# Patient Record
Sex: FEMALE | Race: WHITE | Employment: OTHER | URBAN - METROPOLITAN AREA
[De-identification: names, ages, dates, MRNs, and addresses within clinical notes are randomized per-mention and may not be internally consistent; named-entity substitution may affect disease eponyms.]

---

## 2017-02-18 ENCOUNTER — HOSPITAL ENCOUNTER (EMERGENCY)
Facility: CLINIC | Age: 57
Discharge: HOME | End: 2017-02-18
Attending: EMERGENCY MEDICINE | Admitting: EMERGENCY MEDICINE
Payer: MEDICAID

## 2017-02-18 VITALS
TEMPERATURE: 99 F | DIASTOLIC BLOOD PRESSURE: 96 MMHG | OXYGEN SATURATION: 98 % | WEIGHT: 218 LBS | HEART RATE: 69 BPM | SYSTOLIC BLOOD PRESSURE: 151 MMHG | BODY MASS INDEX: 39.87 KG/M2 | RESPIRATION RATE: 18 BRPM

## 2017-02-18 DIAGNOSIS — H65.01 RIGHT ACUTE SEROUS OTITIS MEDIA, RECURRENCE NOT SPECIFIED: ICD-10-CM

## 2017-02-18 DIAGNOSIS — H83.01 LABYRINTHITIS OF RIGHT EAR: ICD-10-CM

## 2017-02-18 DIAGNOSIS — H81.391 PERIPHERAL VERTIGO, RIGHT: ICD-10-CM

## 2017-02-18 PROCEDURE — 99282 EMERGENCY DEPT VISIT SF MDM: CPT

## 2017-02-18 RX ORDER — ONDANSETRON 4 MG/1
4 TABLET, ORALLY DISINTEGRATING ORAL EVERY 8 HOURS PRN
Qty: 10 TABLET | Refills: 0 | Status: SHIPPED | OUTPATIENT
Start: 2017-02-18 | End: 2017-02-21

## 2017-02-18 RX ORDER — MECLIZINE HCL 12.5 MG 12.5 MG/1
12.5 TABLET ORAL 4 TIMES DAILY PRN
Qty: 30 TABLET | Refills: 0 | Status: SHIPPED | OUTPATIENT
Start: 2017-02-18

## 2017-02-18 RX ORDER — ASCORBIC ACID 250 MG
250 TABLET,CHEWABLE ORAL DAILY
COMMUNITY

## 2017-02-18 RX ORDER — FLUTICASONE PROPIONATE 50 MCG
2 SPRAY, SUSPENSION (ML) NASAL DAILY
COMMUNITY

## 2017-02-18 RX ORDER — BIOTIN 10 MG
10000 TABLET ORAL DAILY
COMMUNITY
End: 2018-05-31

## 2017-02-18 RX ORDER — POLYETHYLENE GLYCOL 3350 17 G/17G
1 POWDER, FOR SOLUTION ORAL DAILY
COMMUNITY
End: 2018-05-31

## 2017-02-18 RX ORDER — ESTRADIOL 0.1 MG/G
1 CREAM VAGINAL
COMMUNITY

## 2017-02-18 ASSESSMENT — ENCOUNTER SYMPTOMS
NAUSEA: 1
HEADACHES: 0
DIZZINESS: 1

## 2017-02-18 NOTE — ED AVS SNAPSHOT
[facility] Emergency Department    [address]    [facility] [address]    Phone:  [phone]    Fax:  [phone]                                       Moni Liriano   MRN: [id number]    Department:  [facility] Emergency Department   Date of Visit:  2/18/2017           After Visit Summary Signature Page     I have received my discharge instructions, and my questions have been answered. I have discussed any challenges I see with this plan with the nurse or doctor.    ..........................................................................................................................................  Patient/Patient Representative Signature      ..........................................................................................................................................  Patient Representative Print Name and Relationship to Patient    ..................................................               ................................................  Date                                            Time    ..........................................................................................................................................  Reviewed by Signature/Title    ...................................................              ..............................................  Date                                                            Time

## 2017-02-18 NOTE — ED AVS SNAPSHOT
[facility] Emergency Department    [address]    [facility] [address]    Phone:  [phone]    Fax:  [phone]                                       Moni Liriano   MRN: [id number]    Department:  [facility] Emergency Department   Date of Visit:  2/18/2017           Patient Information     Date Of Birth          1960        Your diagnoses for this visit were:     Right acute serous otitis media, recurrence not specified     Labyrinthitis of right ear     Peripheral vertigo, right        You were seen by Seth Red MD.      Follow-up Information     Follow up with Jose Bill MD In 2 days.    Specialty:  Otolaryngology    Contact information:    [facility]  [address]  [facility] [address]  [phone]          Discharge Instructions         Fluid in the Middle Ear, No Infection (Adult)  Earaches can happen without an infection. This occurs when air and fluid build up behind the eardrum causing a feeling of fullness and discomfort and reduced hearing. This is called otitis media with effusion (OME) or serous otitis media. It means there is fluid in the middle ear. It is not the same as acute otitis media, which is typically from infection.  OME can happen when you have a cold if congestion blocks the passage that drains the middle ear (eustachian tube). It may also occur with nasal allergies or after a bacterial middle ear infection.    The pain/discomfort may come and go. You may hear clicking or popping sounds when you chew or swallow. You may feel that your balance is off. Or you may hear ringing in the ear.  It often takes from several weeks up to 3 months for the fluid to clear on its own. Oral pain relievers and ear drops help if there is pain. Decongestants and antihistamines sometimes help. Antibiotics don't help since there is no infection. Your doctor may prescribe a nasal spray to help reduce swelling in the nose and  eustachian tube. This can allow the ear to drain.  If it doesn't improve after 3 months, surgery may be used to drain the fluid and insert a small tube in the eardrum to allow continued drainage.  Because the middle ear fluid can become infected, it is important to watch for signs of an ear infection which may develop later. These signs include increased ear pain, fever, or drainage from the ear.  Home care  The following guidelines will help you care for yourself at home:    You may use acetaminophen or ibuprofen to control pain, unless another medicine was prescribed. [NOTE: If you have chronic liver or kidney disease or ever had a stomach ulcer or GI bleeding, talk with your doctor before using these medicines.] (Aspirin should never be used in anyone under 18 years of age who is ill with a fever. It may cause severe liver damage.)    While not always helpful, you may use over-the-counter decongestants such as phenylephrine or pseudoephedrine. Don't use nasal spray decongestants more than 3 days. Longer use can make congestion worse. (Prescription nasal sprays from your doctor don't typically have those restrictions.)    Antihistamines may help if you are also having allergy symptoms.    You may use medicines such as guaifenesin to thin mucus and promote drainage.  Follow-up care  Follow up with your doctor or as advised if you are not feeling better after 3 days.  When to seek medical care  Get prompt medical attention if any of the following occur:    Ear pain gets worse or does not start to improve     Fever of 100.4 F (38 C) or higher, or as directed by your health care provider    Fluid or blood draining from the ear    Headache or sinus pain    Stiff neck    Unusual drowsiness or confusion    6926-5080 [employer]. [address] [place] [address]. All rights reserved. This information is not intended as a substitute for professional medical care. Always follow your healthcare  professional's instructions.    Discharge Instructions  Vertigo  You have been diagnosed with vertigo.  This is a feeling that you are spinning or that the room is moving around you. You will often have nausea, vomiting, and balance problems with it.  Vertigo is usually caused by a problem in the inner ear which helps control your balance.  Many things can cause vertigo, including calcium collections in the inner ear, a virus infection of the inner ear, concussion, migraine, and some medicines.  Luckily, these causes are not life threatening and will eventually go away.  However, sometimes there is a serious problem that does not show up right away.  Return to the Emergency Department if you have:    New or severe headache.    Temperature greater than 100.4 F (38 C).    Seeing double or having trouble seeing clearly.    Trouble speaking or hearing.    Weakness in an arm or leg.    Passing out.    Numbness or tingling.    Chest pain.    Vomiting that will not stop.    Treatment:    An antihistamine, such as Antivert  (meclizine), or non-prescription medicines like Dramamine  (dimenhydrinate), or Benadryl  (diphenhydramine).    Prescription anti-nausea medicines, such as Phenergan  (promethazine), Reglan  (metoclopramide), or Zofran  (ondansetron).    Prescription sedative medicines, such as Valium  (diazepam), Ativan  (lorazepam), or Klonopin  (clonazepam).    Most of these medicines make you sleepy, and you should not take them before you work or drive. You should only take prescription medicines to treat severe vertigo symptoms, and you should stop the medicine when your symptoms improve.    Follow Up:    If you have vertigo longer than three days, it is important that you follow up either with your primary doctor or an Ear Nose and Throat doctor.  You may need further testing to evaluate your vertigo and you may also need  vestibular  therapy which is a special form of physical therapy to make the vertigo go away.     If you were given a prescription for medicine here today, be sure to read all of the information (including the package insert) that comes with your prescription.  This will include important information about the medicine, its side effects, and any warnings that you need to know about.  The pharmacist who fills the prescription can provide more information and answer questions you may have about the medicine.  If you have questions or concerns that the pharmacist cannot address, please call or return to the Emergency Department.       Opioid Medication Information    Pain medications are among the most commonly prescribed medicines, so we are including this information for all our patients. If you did not receive pain medication or get a prescription for pain medicine, you can ignore it.     You may have been given a prescription for an opioid (narcotic) pain medicine and/or have received a pain medicine while here in the Emergency Department. These medicines can make you drowsy or impaired. You must not drive, operate dangerous equipment, or engage in any other dangerous activities while taking these medications. If you drive while taking these medications, you could be arrested for DUI, or driving under the influence. Do not drink any alcohol while you are taking these medications.     Opioid pain medications can cause addiction. If you have a history of chemical dependency of any type, you are at a higher risk of becoming addicted to pain medications.  Only take these prescribed medications to treat your pain when all other options have been tried. Take it for as short a time and as few doses as possible. Store your pain pills in a secure place, as they are frequently stolen and provide a dangerous opportunity for children or visitors in your house to start abusing these powerful medications. We will not replace any lost or stolen medicine.  As soon as your pain is better, you should flush all your remaining  medication.     Many prescription pain medications contain Tylenol  (acetaminophen), including Vicodin , Tylenol #3 , Norco , Lortab , and Percocet .  You should not take any extra pills of Tylenol  if you are using these prescription medications or you can get very sick.  Do not ever take more than 3000 mg of acetaminophen in any 24 hour period.    All opioids tend to cause constipation. Drink plenty of water and eat foods that have a lot of fiber, such as fruits, vegetables, prune juice, apple juice and high fiber cereal.  Take a laxative if you don t move your bowels at least every other day. Miralax , Milk of Magnesia, Colace , or Senna  can be used to keep you regular.      Remember that you can always come back to the Emergency Department if you are not able to see your regular doctor in the amount of time listed above, if you get any new symptoms, or if there is anything that worries you.              24 Hour Appointment Hotline       To make an appointment at any [facility], call [phone] ([phone]). If you don't have a family doctor or clinic, we will help you find one. [facility] are conveniently located to serve the needs of you and your family.             Review of your medicines      START taking        Dose / Directions Last dose taken    meclizine 12.5 MG tablet   Commonly known as:  ANTIVERT   Dose:  12.5 mg   Quantity:  30 tablet        Take 1 tablet (12.5 mg) by mouth 4 times daily as needed for dizziness   Refills:  0        ondansetron 4 MG ODT tab   Commonly known as:  ZOFRAN ODT   Dose:  4 mg   Quantity:  10 tablet        Take 1 tablet (4 mg) by mouth every 8 hours as needed for nausea   Refills:  0          Our records show that you are taking the medicines listed below. If these are incorrect, please call your family doctor or clinic.        Dose / Directions Last dose taken    ascorbic acid 250 MG Chew chewable tablet   Commonly known as:  vitamin C   Dose:  250 mg         Take 250 mg by mouth daily   Refills:  0        Biotin 10 MG Tabs tablet   Dose:  75813 mcg        Take 10,000 mcg by mouth daily   Refills:  0        CLINDAMYCIN HCL PO        Refills:  0        CLONAZEPAM PO   Dose:  1 mg        Take 1 mg by mouth At Bedtime   Refills:  0        cyanocobalamin 1000 MCG/ML injection   Commonly known as:  VITAMIN B12   Dose:  1 mL        Inject 1 mL as directed every 30 days   Refills:  0        CYCLOBENZAPRINE HCL PO        Refills:  0        CYMBALTA PO   Dose:  120 mg        Take 120 mg by mouth every morning   Refills:  0        DOCUSATE SODIUM PO        Refills:  0        estradiol 0.1 MG/GM cream   Commonly known as:  ESTRACE   Dose:  2 g        Place 2 g vaginally twice a week   Refills:  0        FEXOFENADINE HCL PO        Refills:  0        fluticasone 50 MCG/ACT spray   Commonly known as:  FLONASE   Dose:  1 spray        Spray 1 spray into both nostrils daily   Refills:  0        FOLIC ACID PO   Dose:  3 mg        Take 3 mg by mouth daily   Refills:  0        gabapentin 300 MG capsule   Commonly known as:  NEURONTIN   Dose:  300 mg        Take 300 mg by mouth 3 times daily   Refills:  0        HYDROXYZINE HCL PO        Refills:  0        IBUPROFEN PO   Dose:  800 mg        Take 800 mg by mouth   Refills:  0        LAMICTAL PO   Dose:  150 mg        Take 150 mg by mouth At Bedtime   Refills:  0        NAPROXEN PO   Dose:  500 mg        Take 500 mg by mouth   Refills:  0        OMEPRAZOLE PO        Refills:  0        OXYCONTIN PO        Refills:  0        polyethylene glycol Packet   Commonly known as:  MIRALAX/GLYCOLAX   Dose:  1 packet        Take 1 packet by mouth daily   Refills:  0        TRAZODONE HCL PO   Dose:  150 mg        Take 150 mg by mouth At Bedtime   Refills:  0                Prescriptions were sent or printed at these locations (2 Prescriptions)                   Other Prescriptions                Printed at Department/Unit printer (2 of 2)          meclizine (ANTIVERT) 12.5 MG tablet               ondansetron (ZOFRAN ODT) 4 MG ODT tab                Orders Needing Specimen Collection     None      Pending Results     No orders found from 2/16/2017 to 2/19/2017.            Pending Culture Results     No orders found from 2/16/2017 to 2/19/2017.             Test Results from your hospital stay            Clinical Quality Measure: Blood Pressure Screening     Your blood pressure was checked while you were in the emergency department today. The last reading we obtained was  BP: (!) 151/96 . Please read the guidelines below about what these numbers mean and what you should do about them.  If your systolic blood pressure (the top number) is less than 120 and your diastolic blood pressure (the bottom number) is less than 80, then your blood pressure is normal. There is nothing more that you need to do about it.  If your systolic blood pressure (the top number) is 120-139 or your diastolic blood pressure (the bottom number) is 80-89, your blood pressure may be higher than it should be. You should have your blood pressure rechecked within a year by a primary care provider.  If your systolic blood pressure (the top number) is 140 or greater or your diastolic blood pressure (the bottom number) is 90 or greater, you may have high blood pressure. High blood pressure is treatable, but if left untreated over time it can put you at risk for heart attack, stroke, or kidney failure. You should have your blood pressure rechecked by a primary care provider within the next 4 weeks.  If your provider in the emergency department today gave you specific instructions to follow-up with your doctor or provider even sooner than that, you should follow that instruction and not wait for up to 4 weeks for your follow-up visit.        Thank you for choosing [facility]       Thank you for choosing [facility] for your care. Our goal is always to provide you with excellent care. Hearing back from  "our patients is one way we can continue to improve our services. Please take a few minutes to complete the written survey that you may receive in the mail after you visit with us. Thank you!        [employer] Information     [employer] lets you send messages to your doctor, view your test results, renew your prescriptions, schedule appointments and more. To sign up, go to [email or web address] . Click on \"Log in\" on the left side of the screen, which will take you to the Welcome page. Then click on \"Sign up Now\" on the right side of the page.     You will be asked to enter the access code listed below, as well as some personal information. Please follow the directions to create your username and password.     Your access code is: [email or web address]  Expires: 2017  3:42 PM     Your access code will  in 90 days. If you need help or a new code, please call your [place] clinic or [phone].        Care EveryWhere ID     This is your Care EveryWhere ID. This could be used by other organizations to access your [place] medical records  [id number]        After Visit Summary       This is your record. Keep this with you and show to your community pharmacist(s) and doctor(s) at your next visit.                  "

## 2017-02-18 NOTE — DISCHARGE INSTRUCTIONS
Fluid in the Middle Ear, No Infection (Adult)  Earaches can happen without an infection. This occurs when air and fluid build up behind the eardrum causing a feeling of fullness and discomfort and reduced hearing. This is called otitis media with effusion (OME) or serous otitis media. It means there is fluid in the middle ear. It is not the same as acute otitis media, which is typically from infection.  OME can happen when you have a cold if congestion blocks the passage that drains the middle ear (eustachian tube). It may also occur with nasal allergies or after a bacterial middle ear infection.    The pain/discomfort may come and go. You may hear clicking or popping sounds when you chew or swallow. You may feel that your balance is off. Or you may hear ringing in the ear.  It often takes from several weeks up to 3 months for the fluid to clear on its own. Oral pain relievers and ear drops help if there is pain. Decongestants and antihistamines sometimes help. Antibiotics don't help since there is no infection. Your doctor may prescribe a nasal spray to help reduce swelling in the nose and eustachian tube. This can allow the ear to drain.  If it doesn't improve after 3 months, surgery may be used to drain the fluid and insert a small tube in the eardrum to allow continued drainage.  Because the middle ear fluid can become infected, it is important to watch for signs of an ear infection which may develop later. These signs include increased ear pain, fever, or drainage from the ear.  Home care  The following guidelines will help you care for yourself at home:    You may use acetaminophen or ibuprofen to control pain, unless another medicine was prescribed. [NOTE: If you have chronic liver or kidney disease or ever had a stomach ulcer or GI bleeding, talk with your doctor before using these medicines.] (Aspirin should never be used in anyone under 18 years of age who is ill with a fever. It may cause severe liver  damage.)    While not always helpful, you may use over-the-counter decongestants such as phenylephrine or pseudoephedrine. Don't use nasal spray decongestants more than 3 days. Longer use can make congestion worse. (Prescription nasal sprays from your doctor don't typically have those restrictions.)    Antihistamines may help if you are also having allergy symptoms.    You may use medicines such as guaifenesin to thin mucus and promote drainage.  Follow-up care  Follow up with your doctor or as advised if you are not feeling better after 3 days.  When to seek medical care  Get prompt medical attention if any of the following occur:    Ear pain gets worse or does not start to improve     Fever of 100.4 F (38 C) or higher, or as directed by your health care provider    Fluid or blood draining from the ear    Headache or sinus pain    Stiff neck    Unusual drowsiness or confusion    7375-6136 [employer]. [address] [place] [address]. All rights reserved. This information is not intended as a substitute for professional medical care. Always follow your healthcare professional's instructions.    Discharge Instructions  Vertigo  You have been diagnosed with vertigo.  This is a feeling that you are spinning or that the room is moving around you. You will often have nausea, vomiting, and balance problems with it.  Vertigo is usually caused by a problem in the inner ear which helps control your balance.  Many things can cause vertigo, including calcium collections in the inner ear, a virus infection of the inner ear, concussion, migraine, and some medicines.  Luckily, these causes are not life threatening and will eventually go away.  However, sometimes there is a serious problem that does not show up right away.  Return to the Emergency Department if you have:    New or severe headache.    Temperature greater than 100.4 F (38 C).    Seeing double or having trouble seeing clearly.    Trouble  speaking or hearing.    Weakness in an arm or leg.    Passing out.    Numbness or tingling.    Chest pain.    Vomiting that will not stop.    Treatment:    An antihistamine, such as Antivert  (meclizine), or non-prescription medicines like Dramamine  (dimenhydrinate), or Benadryl  (diphenhydramine).    Prescription anti-nausea medicines, such as Phenergan  (promethazine), Reglan  (metoclopramide), or Zofran  (ondansetron).    Prescription sedative medicines, such as Valium  (diazepam), Ativan  (lorazepam), or Klonopin  (clonazepam).    Most of these medicines make you sleepy, and you should not take them before you work or drive. You should only take prescription medicines to treat severe vertigo symptoms, and you should stop the medicine when your symptoms improve.    Follow Up:    If you have vertigo longer than three days, it is important that you follow up either with your primary doctor or an Ear Nose and Throat doctor.  You may need further testing to evaluate your vertigo and you may also need  vestibular  therapy which is a special form of physical therapy to make the vertigo go away.    If you were given a prescription for medicine here today, be sure to read all of the information (including the package insert) that comes with your prescription.  This will include important information about the medicine, its side effects, and any warnings that you need to know about.  The pharmacist who fills the prescription can provide more information and answer questions you may have about the medicine.  If you have questions or concerns that the pharmacist cannot address, please call or return to the Emergency Department.       Opioid Medication Information    Pain medications are among the most commonly prescribed medicines, so we are including this information for all our patients. If you did not receive pain medication or get a prescription for pain medicine, you can ignore it.     You may have been given a  prescription for an opioid (narcotic) pain medicine and/or have received a pain medicine while here in the Emergency Department. These medicines can make you drowsy or impaired. You must not drive, operate dangerous equipment, or engage in any other dangerous activities while taking these medications. If you drive while taking these medications, you could be arrested for DUI, or driving under the influence. Do not drink any alcohol while you are taking these medications.     Opioid pain medications can cause addiction. If you have a history of chemical dependency of any type, you are at a higher risk of becoming addicted to pain medications.  Only take these prescribed medications to treat your pain when all other options have been tried. Take it for as short a time and as few doses as possible. Store your pain pills in a secure place, as they are frequently stolen and provide a dangerous opportunity for children or visitors in your house to start abusing these powerful medications. We will not replace any lost or stolen medicine.  As soon as your pain is better, you should flush all your remaining medication.     Many prescription pain medications contain Tylenol  (acetaminophen), including Vicodin , Tylenol #3 , Norco , Lortab , and Percocet .  You should not take any extra pills of Tylenol  if you are using these prescription medications or you can get very sick.  Do not ever take more than 3000 mg of acetaminophen in any 24 hour period.    All opioids tend to cause constipation. Drink plenty of water and eat foods that have a lot of fiber, such as fruits, vegetables, prune juice, apple juice and high fiber cereal.  Take a laxative if you don t move your bowels at least every other day. Miralax , Milk of Magnesia, Colace , or Senna  can be used to keep you regular.      Remember that you can always come back to the Emergency Department if you are not able to see your regular doctor in the amount of time listed  above, if you get any new symptoms, or if there is anything that worries you.

## 2017-02-18 NOTE — ED PROVIDER NOTES
History     Chief Complaint:  Ear Problem      HPI   Moni Liriano is a 56 year old female who has had problems with her right ear for last 6 weeks beginning in early January. She has been on several course of antibiotics and continues to have fullness and decreased hearing on the side.     She is also complaining that for the last several weeks, she has had some vertiginous symptoms, which occur when she moves her head quickly. Theses rapidly resolve with cessation of movement. Although she has had some ear fullness and pain, she denies any headache.     Allergies:  Contrast Dye  Shellfish Allergy, rash  Penicillins    Medications:    CYCLOBENZAPRINE HCL PO  DOCUSATE SODIUM PO  estradiol (ESTRACE) 0.1 MG/GM cream  fluticasone (FLONASE) 50 MCG/ACT spray  OxyCODONE HCl (OXYCONTIN PO)  OMEPRAZOLE PO  HYDROXYZINE HCL PO  FEXOFENADINE HCL PO  meclizine (ANTIVERT) 12.5 MG tablet  ondansetron (ZOFRAN ODT) 4 MG ODT tab  gabapentin (NEURONTIN) 300 MG capsule  NAPROXEN PO  DULoxetine HCl (CYMBALTA PO)  TRAZODONE HCL PO  CLONAZEPAM PO  LamoTRIgine (LAMICTAL PO)  cyanocobalamin 1000 MCG/ML injection    Past Medical History:    Anemia  Blood transfusion    Past Surgical History:    appendectomy  cholelithiasis  Orthopedic surgery  total knee arthroplasty  D and C    Family History:    No past pertinent family history.    Social History:  Negative for tobacco use.  Negative for alcohol use.  Marital Status:   [2]    Review of Systems   HENT: Positive for ear pain.    Gastrointestinal: Positive for nausea.   Neurological: Positive for dizziness. Negative for headaches.   All other systems reviewed and are negative.    Physical Exam     Patient Vitals for the past 24 hrs:   BP Temp Temp src Pulse Resp SpO2 Weight   02/18/17 1518 (!) 151/96 99  F (37.2  C) Oral 69 18 98 % 98.9 kg (218 lb)       Physical Exam  General: Alert, sitting.  HEENT:   The scalp and head appear normal    No percussive tenderness to frontal or  maxillary sinuses.     The pupils are equal, round, and reactive to light    Extraocular muscles are intact.    The nose is normal.    The oropharynx is normal.      Uvula is in the midline.  There is no peritonsillar abscess.    The patient has a normal appearing right external canal and left appearing TM  with good conical light reflex. There is normal bony landmarks on TM.     Right TM, however, though she has a normal appearing external auditory canal,  has some mild opacity and slightly decreased light reflex present. There is also  slight decrease in definition of bony landmarks. There is no evidence, however,  of infection. Squamous epithelial about right ear appears normal.   Neck:  Normal range of motion. Supple neck.    Lungs:  Clear.      No rales, no wheezing.      There is no tachypnea.  Non-labored.  Cardiac: Regular rate.      Normal S1 and S2.      No S3 or S4.      No pathological murmur.      No pericardial rub.  Abdomen: Soft. No distension. No tympani. No rebound. Non-tender.  Lymph: No anterior or posterior cervical lymphadenopathy noted.  MS:  Normal tone.      Normal movement of all extremities.    Neuro:  Normal mentation.  No focal motor or sensory changes.      Speech normal.  Psych:  Awake.     Alert.      Normal affect.      Appropriate interactions.  Skin:  No rash.      No lesions.    Emergency Department Course   Emergency Department Course:  Nursing notes and vitals reviewed. I performed an exam of the patient as documented above.     Findings and plan explained to the Patient. Patient discharged home with instructions regarding supportive care, medications, and reasons to return. The importance of close follow-up was reviewed. The patient was prescribed Meclizine and Zofran.     Impression & Plan    Medical Decision Making:  Moni Liriano is a 56 year old female does have serous otitis without evidence of infection, likely due to eustachian  dysfunction as she does get a history of  upper respiratory infection earlier this winter, and vertigo, which occurs with movement, resolved with cessation of movement.    I am going to have her continue her Flonase, have her start meclizine as she needs in for the intermittent vertiginous symptoms, and Zofran as needed for nausea. I have given her a referral for Dr. Jose Bill with otolaryngology. I have instructed to call Monday morning at 08:30 or 09:00 when the clinic opens and tell the  that she has been seen in our ER and has been having problems since January of this year with her right ear. They will schedule her for evaluation. In the mean time, if she should develop any new symptoms, such as fevers, worsening pain, or new symptoms, she should return.     Diagnosis:    ICD-10-CM   1. Right acute serous otitis media, recurrence not specified H65.01   2. Labyrinthitis of right ear H83.01   3. Peripheral vertigo, right, related to serous otitis  H81.391     Disposition:  discharged to home    Discharge Medications:  Discharge Medication List as of 2/18/2017  3:56 PM      START taking these medications    Details   meclizine (ANTIVERT) 12.5 MG tablet Take 1 tablet (12.5 mg) by mouth 4 times daily as needed for dizziness, Disp-30 tablet, R-0, Local Print      ondansetron (ZOFRAN ODT) 4 MG ODT tab Take 1 tablet (4 mg) by mouth every 8 hours as needed for nausea, Disp-10 tablet, R-0, Local Print           IJeni, am serving as a scribe on 2/18/2017 at 3:48 PM to personally document services performed by Seth Red MD based on my observations and the provider's statements to me.     Jeni Charles  2/18/2017   [facility] EMERGENCY DEPARTMENT       Seth Red MD  02/18/17 7693

## 2017-02-18 NOTE — ED NOTES
"Pt presents to ED c/o an ear problem. Since 1/7 she has had ongoing problems w/ her right ear. She has had trouble hearing out of ear and has had discomfort. Also reports vertigo. Has been seen multiple times for this. Has taken x2 rounds of prednisone and a zpack w/out improvement. States her ear is \"plugged.\" Also reports a balance problem today. Hx of asthma. Pt is A&O x4, ABCs intact.   "

## 2018-05-31 RX ORDER — ALBUTEROL SULFATE 90 UG/1
2 AEROSOL, METERED RESPIRATORY (INHALATION) EVERY 4 HOURS PRN
COMMUNITY

## 2018-05-31 RX ORDER — MULTIPLE VITAMINS W/ MINERALS TAB 9MG-400MCG
1 TAB ORAL DAILY
COMMUNITY

## 2018-05-31 RX ORDER — GUAIFENESIN 600 MG/1
1200 TABLET, EXTENDED RELEASE ORAL 2 TIMES DAILY
COMMUNITY

## 2018-06-04 ENCOUNTER — ANESTHESIA EVENT (OUTPATIENT)
Dept: SURGERY | Facility: CLINIC | Age: 58
End: 2018-06-04
Payer: COMMERCIAL

## 2018-06-04 ENCOUNTER — HOSPITAL ENCOUNTER (OUTPATIENT)
Facility: CLINIC | Age: 58
Discharge: HOME | End: 2018-06-04
Attending: ORTHOPAEDIC SURGERY | Admitting: ORTHOPAEDIC SURGERY
Payer: COMMERCIAL

## 2018-06-04 ENCOUNTER — APPOINTMENT (OUTPATIENT)
Dept: GENERAL RADIOLOGY | Facility: CLINIC | Age: 58
End: 2018-06-04
Attending: ORTHOPAEDIC SURGERY
Payer: COMMERCIAL

## 2018-06-04 ENCOUNTER — ANESTHESIA (OUTPATIENT)
Dept: SURGERY | Facility: CLINIC | Age: 58
End: 2018-06-04
Payer: COMMERCIAL

## 2018-06-04 VITALS
RESPIRATION RATE: 16 BRPM | WEIGHT: 204.1 LBS | BODY MASS INDEX: 34.85 KG/M2 | TEMPERATURE: 98.6 F | DIASTOLIC BLOOD PRESSURE: 86 MMHG | HEIGHT: 64 IN | OXYGEN SATURATION: 96 % | SYSTOLIC BLOOD PRESSURE: 129 MMHG

## 2018-06-04 DIAGNOSIS — T84.9XXD FAILURE OF JOINT FUSION, SUBSEQUENT ENCOUNTER: Primary | ICD-10-CM

## 2018-06-04 PROCEDURE — C1762 CONN TISS, HUMAN(INC FASCIA): HCPCS | Performed by: ORTHOPAEDIC SURGERY

## 2018-06-04 PROCEDURE — 25000125 ZZHC RX 250: Performed by: ANESTHESIOLOGY

## 2018-06-04 PROCEDURE — C1713 ANCHOR/SCREW BN/BN,TIS/BN: HCPCS | Performed by: ORTHOPAEDIC SURGERY

## 2018-06-04 PROCEDURE — 25000128 H RX IP 250 OP 636: Performed by: NURSE ANESTHETIST, CERTIFIED REGISTERED

## 2018-06-04 PROCEDURE — 25000128 H RX IP 250 OP 636: Performed by: ANESTHESIOLOGY

## 2018-06-04 PROCEDURE — 40000170 ZZH STATISTIC PRE-PROCEDURE ASSESSMENT II: Performed by: ORTHOPAEDIC SURGERY

## 2018-06-04 PROCEDURE — 36000058 ZZH SURGERY LEVEL 3 EA 15 ADDTL MIN: Performed by: ORTHOPAEDIC SURGERY

## 2018-06-04 PROCEDURE — 27210794 ZZH OR GENERAL SUPPLY STERILE: Performed by: ORTHOPAEDIC SURGERY

## 2018-06-04 PROCEDURE — 25000128 H RX IP 250 OP 636: Performed by: PHYSICIAN ASSISTANT

## 2018-06-04 PROCEDURE — 25000132 ZZH RX MED GY IP 250 OP 250 PS 637: Performed by: PHYSICIAN ASSISTANT

## 2018-06-04 PROCEDURE — 37000009 ZZH ANESTHESIA TECHNICAL FEE, EACH ADDTL 15 MIN: Performed by: ORTHOPAEDIC SURGERY

## 2018-06-04 PROCEDURE — 37000008 ZZH ANESTHESIA TECHNICAL FEE, 1ST 30 MIN: Performed by: ORTHOPAEDIC SURGERY

## 2018-06-04 PROCEDURE — 27211024 ZZHC OR SUPPLY OTHER OPNP: Performed by: ORTHOPAEDIC SURGERY

## 2018-06-04 PROCEDURE — 25000125 ZZHC RX 250: Performed by: NURSE ANESTHETIST, CERTIFIED REGISTERED

## 2018-06-04 PROCEDURE — 36000060 ZZH SURGERY LEVEL 3 W FLUORO 1ST 30 MIN: Performed by: ORTHOPAEDIC SURGERY

## 2018-06-04 PROCEDURE — 71000012 ZZH RECOVERY PHASE 1 LEVEL 1 FIRST HR: Performed by: ORTHOPAEDIC SURGERY

## 2018-06-04 PROCEDURE — 40000277 XR SURGERY CARM FLUORO LESS THAN 5 MIN W STILLS

## 2018-06-04 PROCEDURE — 71000027 ZZH RECOVERY PHASE 2 EACH 15 MINS: Performed by: ORTHOPAEDIC SURGERY

## 2018-06-04 DEVICE — IMPLANTABLE DEVICE: Type: IMPLANTABLE DEVICE | Site: FOOT | Status: FUNCTIONAL

## 2018-06-04 RX ORDER — DEXAMETHASONE SODIUM PHOSPHATE 4 MG/ML
INJECTION, SOLUTION INTRA-ARTICULAR; INTRALESIONAL; INTRAMUSCULAR; INTRAVENOUS; SOFT TISSUE PRN
Status: DISCONTINUED | OUTPATIENT
Start: 2018-06-04 | End: 2018-06-04

## 2018-06-04 RX ORDER — LIDOCAINE HYDROCHLORIDE 20 MG/ML
INJECTION, SOLUTION INFILTRATION; PERINEURAL PRN
Status: DISCONTINUED | OUTPATIENT
Start: 2018-06-04 | End: 2018-06-04

## 2018-06-04 RX ORDER — AMOXICILLIN 250 MG
1-2 CAPSULE ORAL 2 TIMES DAILY
Qty: 30 TABLET | Refills: 0 | Status: SHIPPED | OUTPATIENT
Start: 2018-06-04

## 2018-06-04 RX ORDER — ACETAMINOPHEN 325 MG/1
650 TABLET ORAL
Status: DISCONTINUED | OUTPATIENT
Start: 2018-06-04 | End: 2018-06-04 | Stop reason: HOSPADM

## 2018-06-04 RX ORDER — HYDROXYZINE HYDROCHLORIDE 25 MG/1
25 TABLET, FILM COATED ORAL
Status: DISCONTINUED | OUTPATIENT
Start: 2018-06-04 | End: 2018-06-04 | Stop reason: HOSPADM

## 2018-06-04 RX ORDER — FENTANYL CITRATE 50 UG/ML
INJECTION, SOLUTION INTRAMUSCULAR; INTRAVENOUS PRN
Status: DISCONTINUED | OUTPATIENT
Start: 2018-06-04 | End: 2018-06-04

## 2018-06-04 RX ORDER — SODIUM CHLORIDE, SODIUM LACTATE, POTASSIUM CHLORIDE, CALCIUM CHLORIDE 600; 310; 30; 20 MG/100ML; MG/100ML; MG/100ML; MG/100ML
INJECTION, SOLUTION INTRAVENOUS CONTINUOUS PRN
Status: DISCONTINUED | OUTPATIENT
Start: 2018-06-04 | End: 2018-06-04

## 2018-06-04 RX ORDER — CEFAZOLIN SODIUM 1 G/3ML
1 INJECTION, POWDER, FOR SOLUTION INTRAMUSCULAR; INTRAVENOUS SEE ADMIN INSTRUCTIONS
Status: DISCONTINUED | OUTPATIENT
Start: 2018-06-04 | End: 2018-06-04 | Stop reason: HOSPADM

## 2018-06-04 RX ORDER — FENTANYL CITRATE 50 UG/ML
25-50 INJECTION, SOLUTION INTRAMUSCULAR; INTRAVENOUS
Status: DISCONTINUED | OUTPATIENT
Start: 2018-06-04 | End: 2018-06-04 | Stop reason: HOSPADM

## 2018-06-04 RX ORDER — FENTANYL CITRATE 50 UG/ML
50-100 INJECTION, SOLUTION INTRAMUSCULAR; INTRAVENOUS
Status: DISCONTINUED | OUTPATIENT
Start: 2018-06-04 | End: 2018-06-04 | Stop reason: HOSPADM

## 2018-06-04 RX ORDER — PROPOFOL 10 MG/ML
INJECTION, EMULSION INTRAVENOUS CONTINUOUS PRN
Status: DISCONTINUED | OUTPATIENT
Start: 2018-06-04 | End: 2018-06-04

## 2018-06-04 RX ORDER — ACETAMINOPHEN 325 MG/1
650 TABLET ORAL EVERY 4 HOURS PRN
Qty: 100 TABLET | Refills: 0 | COMMUNITY
Start: 2018-06-04

## 2018-06-04 RX ORDER — SODIUM CHLORIDE, SODIUM LACTATE, POTASSIUM CHLORIDE, CALCIUM CHLORIDE 600; 310; 30; 20 MG/100ML; MG/100ML; MG/100ML; MG/100ML
INJECTION, SOLUTION INTRAVENOUS CONTINUOUS
Status: DISCONTINUED | OUTPATIENT
Start: 2018-06-04 | End: 2018-06-04 | Stop reason: HOSPADM

## 2018-06-04 RX ORDER — HYDROMORPHONE HYDROCHLORIDE 1 MG/ML
.3-.5 INJECTION, SOLUTION INTRAMUSCULAR; INTRAVENOUS; SUBCUTANEOUS EVERY 10 MIN PRN
Status: DISCONTINUED | OUTPATIENT
Start: 2018-06-04 | End: 2018-06-04 | Stop reason: HOSPADM

## 2018-06-04 RX ORDER — OXYCODONE HYDROCHLORIDE 5 MG/1
5 TABLET ORAL
Status: COMPLETED | OUTPATIENT
Start: 2018-06-04 | End: 2018-06-04

## 2018-06-04 RX ORDER — CEFAZOLIN SODIUM 2 G/100ML
2 INJECTION, SOLUTION INTRAVENOUS
Status: COMPLETED | OUTPATIENT
Start: 2018-06-04 | End: 2018-06-04

## 2018-06-04 RX ORDER — GABAPENTIN 300 MG/1
300 CAPSULE ORAL 3 TIMES DAILY
Qty: 9 CAPSULE | Refills: 1 | Status: SHIPPED | OUTPATIENT
Start: 2018-06-04

## 2018-06-04 RX ORDER — MEPERIDINE HYDROCHLORIDE 25 MG/ML
12.5 INJECTION INTRAMUSCULAR; INTRAVENOUS; SUBCUTANEOUS
Status: DISCONTINUED | OUTPATIENT
Start: 2018-06-04 | End: 2018-06-04 | Stop reason: HOSPADM

## 2018-06-04 RX ORDER — FENTANYL CITRATE 50 UG/ML
25-50 INJECTION, SOLUTION INTRAMUSCULAR; INTRAVENOUS EVERY 5 MIN PRN
Status: DISCONTINUED | OUTPATIENT
Start: 2018-06-04 | End: 2018-06-04 | Stop reason: HOSPADM

## 2018-06-04 RX ORDER — OXYCODONE HYDROCHLORIDE 5 MG/1
5-15 TABLET ORAL
Qty: 60 TABLET | Refills: 0 | Status: SHIPPED | OUTPATIENT
Start: 2018-06-04

## 2018-06-04 RX ORDER — HYDROXYZINE HYDROCHLORIDE 25 MG/1
25 TABLET, FILM COATED ORAL EVERY 6 HOURS PRN
Qty: 30 TABLET | Refills: 0 | Status: SHIPPED | OUTPATIENT
Start: 2018-06-04

## 2018-06-04 RX ORDER — ONDANSETRON 2 MG/ML
INJECTION INTRAMUSCULAR; INTRAVENOUS PRN
Status: DISCONTINUED | OUTPATIENT
Start: 2018-06-04 | End: 2018-06-04

## 2018-06-04 RX ORDER — ONDANSETRON 2 MG/ML
4 INJECTION INTRAMUSCULAR; INTRAVENOUS EVERY 30 MIN PRN
Status: DISCONTINUED | OUTPATIENT
Start: 2018-06-04 | End: 2018-06-04 | Stop reason: HOSPADM

## 2018-06-04 RX ORDER — ONDANSETRON 4 MG/1
4 TABLET, ORALLY DISINTEGRATING ORAL
Status: DISCONTINUED | OUTPATIENT
Start: 2018-06-04 | End: 2018-06-04 | Stop reason: HOSPADM

## 2018-06-04 RX ORDER — NALOXONE HYDROCHLORIDE 0.4 MG/ML
.1-.4 INJECTION, SOLUTION INTRAMUSCULAR; INTRAVENOUS; SUBCUTANEOUS
Status: DISCONTINUED | OUTPATIENT
Start: 2018-06-04 | End: 2018-06-04 | Stop reason: HOSPADM

## 2018-06-04 RX ORDER — ONDANSETRON 4 MG/1
4 TABLET, ORALLY DISINTEGRATING ORAL EVERY 30 MIN PRN
Status: DISCONTINUED | OUTPATIENT
Start: 2018-06-04 | End: 2018-06-04 | Stop reason: HOSPADM

## 2018-06-04 RX ADMIN — PROPOFOL 100 MCG/KG/MIN: 10 INJECTION, EMULSION INTRAVENOUS at 08:55

## 2018-06-04 RX ADMIN — FENTANYL CITRATE 50 MCG: 50 INJECTION, SOLUTION INTRAMUSCULAR; INTRAVENOUS at 08:45

## 2018-06-04 RX ADMIN — SODIUM CHLORIDE, POTASSIUM CHLORIDE, SODIUM LACTATE AND CALCIUM CHLORIDE: 600; 310; 30; 20 INJECTION, SOLUTION INTRAVENOUS at 08:40

## 2018-06-04 RX ADMIN — OXYCODONE HYDROCHLORIDE 5 MG: 5 TABLET ORAL at 10:30

## 2018-06-04 RX ADMIN — MIDAZOLAM 2 MG: 1 INJECTION INTRAMUSCULAR; INTRAVENOUS at 08:45

## 2018-06-04 RX ADMIN — ONDANSETRON 4 MG: 2 INJECTION INTRAMUSCULAR; INTRAVENOUS at 09:08

## 2018-06-04 RX ADMIN — FENTANYL CITRATE 50 MCG: 50 INJECTION, SOLUTION INTRAMUSCULAR; INTRAVENOUS at 09:00

## 2018-06-04 RX ADMIN — CEFAZOLIN SODIUM 2 G: 2 INJECTION, SOLUTION INTRAVENOUS at 08:57

## 2018-06-04 RX ADMIN — BUPIVACAINE HYDROCHLORIDE 30 ML GIVEN: 5 INJECTION, SOLUTION EPIDURAL; INTRACAUDAL; PERINEURAL at 08:36

## 2018-06-04 RX ADMIN — BUPIVACAINE HYDROCHLORIDE 15 ML GIVEN: 5 INJECTION, SOLUTION EPIDURAL; INTRACAUDAL; PERINEURAL at 08:40

## 2018-06-04 RX ADMIN — LIDOCAINE HYDROCHLORIDE 60 MG: 20 INJECTION, SOLUTION INFILTRATION; PERINEURAL at 08:55

## 2018-06-04 RX ADMIN — FENTANYL CITRATE 50 MCG: 50 INJECTION, SOLUTION INTRAMUSCULAR; INTRAVENOUS at 10:20

## 2018-06-04 RX ADMIN — DEXAMETHASONE SODIUM PHOSPHATE 4 MG: 4 INJECTION, SOLUTION INTRA-ARTICULAR; INTRALESIONAL; INTRAMUSCULAR; INTRAVENOUS; SOFT TISSUE at 09:08

## 2018-06-04 RX ADMIN — FENTANYL CITRATE 50 MCG: 50 INJECTION, SOLUTION INTRAMUSCULAR; INTRAVENOUS at 10:27

## 2018-06-04 ASSESSMENT — COPD QUESTIONNAIRES: COPD: 1

## 2018-06-04 ASSESSMENT — ENCOUNTER SYMPTOMS
ORTHOPNEA: 0
DYSRHYTHMIAS: 0
SEIZURES: 0

## 2018-06-04 ASSESSMENT — LIFESTYLE VARIABLES: TOBACCO_USE: 0

## 2018-06-04 NOTE — IP AVS SNAPSHOT
[place] [address]    [place] [address]    Phone:  [phone]                                       After Visit Summary   6/4/2018    Moni Liriano    MRN: [id number]           After Visit Summary Signature Page     I have received my discharge instructions, and my questions have been answered. I have discussed any challenges I see with this plan with the nurse or doctor.    ..........................................................................................................................................  Patient/Patient Representative Signature      ..........................................................................................................................................  Patient Representative Print Name and Relationship to Patient    ..................................................               ................................................  Date                                            Time    ..........................................................................................................................................  Reviewed by Signature/Title    ...................................................              ..............................................  Date                                                            Time

## 2018-06-04 NOTE — DISCHARGE INSTRUCTIONS
Post-Operative Instruction Sheet for Foot and Ankle Surgery  Jax Boyce M.D.      These precautions MUST be followed for the first 24 hours after surgery:    Upon discharge, go directly home.    You MUST make arrangements for a responsible adult to stay with you the first night following surgery.  Surgery may be cancelled if you do not have someone that can stay with you.    DO NOT DRINK ALCOHOLIC BEVERAGES.    It is not unusual to feel lightheaded up to 24 hours after surgery or while taking pain medications.  If you feel lightheaded, sit up for a few minutes before standing and have someone assist you when you get up to walk or use the restroom.    Do not use any mechanical equipment or heavy machinery.    Do not make any important or legal decisions for 24 hours or while on pain medication.    You may experience dry mouth, sore throat, or sleep disturbances from the anesthesia and medications used during surgery.  Generalized muscle aches can sometimes occur.  These symptoms generally disappear by 24 hours.    The following are general guidelines and instructions about what to expect the first weeks following surgery.  These are not specific, and your recovery may be slightly different.  Please follow the instructions that are specifically written out for you after the surgery if there are any questions.    Pain Management   If you have received a nerve block, the pain relief can last anywhere from 12-30 hours, this also means you may not have sensation or movement in your foot for that amount of time. You will have pain after the block wears off!  Anticipate this and start pain meds prior to the block wearing off.     Take your first dose of the prescribed pain medication a few hours after you get home, even if you have no pain.     Continue to take your medications as prescribed for the first 24-48 hours - ensure that the patient (you) are alert and have no difficulty breathing before taking the  medication.  Often it may be helpful to set an alarm throughout the night to ensure you don t miss a dose of the medication and wake up with a lot more pain.    You can expect that the first two nights will be the most painful and uncomfortable. I will give you strong medication to make you as comfortable as possible, but you may still have some break-through pain.  This is not unexpected, as there are many nerve endings in the foot and ankle and many patients state the pain from foot surgery is worse than most other injuries or procedures!    After the first few days, take the medication as needed for pain.    As your pain improves, you can gradually decrease your pain meds by utilizing Tylenol or an anti-inflammatory medication.  You should also use these medications as directed early in the post-operative process to supplement the narcotic pain medication.    Dressing   Bleeding through the dressings is quite common.    This usually occurs for the first 1-2 hours after surgery. The actual bleeding has stopped by the time you see the drainage through your dressings.    You can reinforce the outside of the dressing with gauze and an Ace wrap unless otherwise directed.  In most cases, your first dressing change will be performed at your first clinic follow-up visit.  In some cases, I may allow you to change your dressings sooner.  Your dressing should be kept DRY at all times - do not shower, bathe, or wet your dressing in any way after surgery!    Wound Care  Once your stitches are removed, you can shower with soapy water and gently cleanse the incision if it is completely dry.     Do not shower or wash the incision(s) if parts of the incision(s) are open or still draining.    Do not soak the incision(s) in a bathtub or hot tub until the incision(s) is completely dry for one week.    Do not soak the incision(s) in lake or ocean water for at least one month post-operatively.    Elevation   I recommend strict  elevation of your foot above the level of your heart for 4-5 days.  Elevation of the foot remains important up to two weeks after surgery to limit swelling and help wound healing.    Elevate your foot/ankle to at least your waist level but above your heart would be best. The more you elevate your foot and ankle, the less pain you will have.     In the first few days following surgery, restrict the time your foot is  down  to 10 minutes or less at a time.    It is also good to keep your blood flowing through the operated leg and limit the risk of blood clots.  The first day following surgery, I would encourage you to get up and move around the house for a few minutes every hour and then return to elevating the foot.    After the strict elevation period (see above) is completed, you may gradually become more active. You should  listen  to your foot/ankle as to when to get off of it and elevate it again.  This may help even months after surgery.  Remember: avoid anything that hurts or makes your foot/ankle swell!    Icing   Icing can be very useful to decrease the pain and swelling of the foot.     Start by first placing a large garbage bag over the dressing.  Ice may then be placed around the extremity by using either bags of ice taped around the extremity   or you may place the extremity in a bucket filled with ice (the dressing MUST be covered with a plastic bag!).  Bags of frozen peas work well!    You should ice for no more than 20 minutes at a time and repeat at 2 hour intervals.     Do NOT place ice directly on your skin or dressing.     Activity     In most cases (unless otherwise instructed), you may not bear weight on your operated foot/ankle for 2 weeks after surgery.  That means the foot may not touch the ground when upright!  Specific weight bearing instructions for your surgery will be provided on the day of surgery.    Your toes may experience bruising after surgery and become darker when the foot hangs  down.  Unless you had surgery on those toes, it is important to actively wiggle your toes for 5-10 minutes each hour.    Many of your questions can be addressed at your 2-week follow-up appointment - please make a list of things to ask us as they come up during your recovery.    What to watch for      Severe swelling and/or pain in the calf: this could indicate a deep vein thrombosis (blood clot in leg) which requires urgent evaluation and treatment!    Profuse bleeding: that which soaks through your dressing and increases in size throughout the first day after surgery.    Blue or white toes: this indicates a lack of blood flow to the foot.     Fever greater than 101.5: fevers less than this are very common the first few days after surgery and are unlikely to indicate infection or any unexpected problem.    Severe pain: that which does not improve after pain medication, except for the first two nights.   If you have any of the above problems or any concerns, please contact my office ([phone]) and further instructions will be provided.  If you are unable to reach anyone or feel you have a medical emergency, please do not hesitate to go to the nearest urgent care or emergency department.    Medications   *Medications may take up to 24 hours to be refilled by my office.*    All pain medications, along with inactivity following surgery, can cause constipation.  Use the stool softeners as recommended, increase fluid intake to at least 1 quart per day, and increase your dietary fiber.  (The  p  fruits - peaches, plums, pears, and prunes - as well as anise/black licorice are generally helpful.)      Antibiotics may be prescribed to limit the risk of infection only in limited circumstances.  Kelfex (cephalexin) 500 mg - This is an antibiotic given in addition to the antibiotic given during surgery to help reduce the chance of post-operative infection.   Dosage: 1 tablet by mouth 4 times a day.  OR   Cleocin  (clindamycin) 600 mg - This is an antibiotic given to those patients who are allergic to penicillin in addition to the antibiotic given during surgery to help reduce the chance of post-operative infection.   Dosage: 1 tablet by mouth 3 times a day.      Anti-nausea and spasms  Hydroxyzine 25 mg  - This medicine should be taken if you experience any nausea or vomiting. If you know you are sensitive to narcotics please take 1 tablet 30 minutes prior to pain medication. This may also help with any mild itching experienced with the pain medication or muscle spasms.  Dosage: 1 tablet by mouth every 6 hours as needed for nausea, itching, spasms, or adjuvant pain control.      Pain medications (you will only be given a prescription for ONE of the following!)   Ultram (tramadol) 50mg - This is a pain medication that should be taken EVERY 4 TO 6 HOURS for pain relief. You should start the medication when you arrive home after surgery, before the nerve block wears off.  The first 1-2 nights you may need to take 2 tablets every 4 hours.  You should be given enough medication to last to the first office visit.  This medication cannot be refilled over the phone!  Dosage: 1-2 tablets every 4-6 hours as needed for pain relief.  OR  Oxycodone 5mg - This is a pain medication that may be taken EVERY 3 to 4 HOURS for pain relief.  You should start the medication when you arrive home after surgery, before the nerve block wears off.  The first 1-2 nights you may need to take up to 2 or even 3 tablets every 3-4 hours. You should be given enough medication to last to the first office visit.  This medication cannot be refilled over the phone!  Dosage: 1-2 (or 3) tablets every 3-4 hours as needed for pain relief.  OR  Norco (hydrocodone/acetominophen) 5/325 mg - This is a pain medication that should be taken EVERY 4 TO 6 HOURS for pain relief. You should start the medication when you arrive home after surgery, before the nerve block wears  off.  The first 1-2 nights you may need to take 2 tablets every 4 hours.  You should be given enough medication to last to the first office visit.  This medication cannot be refilled over the phone!  Dosage: 1-2 tablets every 4-6 hours as needed for pain relief.  *Do NOT take any Tylenol while taking this medication!  You may alternate Tylenol with this medication provided you do not take greater than 3 grams of Tylenol in total over a 24 hour time period.      Blood thinner  Depending on the type of surgery and your personal risk factors, I may prescribe a medication to help limit the risk of developing a blood clot after your surgery.  The length of time to take the recommended medication will be provided and typically lasts until you are moving about normally or bearing weight on the operated foot/ankle.  ECASA (enteric coated aspirin) 325mg tablet daily.  Lovenox (enoxaparin) 40mg subcutaneous injection daily.  Xarelto 10mg tablet daily.      Stool Softener  Take an over the counter stool softener such as senna or Miralax starting the day after your surgery to prevent constipation. This is a common side effect of the narcotic pain medications.  You may stop taking this after you have regular bowel movements or no longer require use of narcotic pain medications.    Dental Implications  Dental procedures should be avoided until your incisions are healed. Furthermore, surgical procedures including hardware or an allograft require taking an antibiotic within one hour of all dental work within 6 months of surgery. Total ankle replacements require indefinite use of antibiotics prior to dental procedures. We would be happy to provide you with the necessary prescription upon request.    Follow-up Visits  You should have your initial post-operative visits already scheduled but if you do not recall the exact dates or do not believe they have been scheduled, please contact Deepika right away to ensure that we have the  appropriate visits in the system.    You will likely follow-up with my physician assistant for your first post-operative visit and for a few of the additional follow-up visits.  He works directly with me on all patients and will be able to inform me if there are any concerns during your recovery process!        You can often find additional information about your procedure or condition on the [facility] website at [email or web address] or [email or web address].    Additional information from reputable orthopaedic foot and ankle surgeons affiliated with the American [employer] Foot and [employer] can be found at [email or web address].    Post-operative Foot and Ankle Surgery Instructions and Tips for Pain Control  Dr. Boyce, [facility]    Non-medication Interventions    Read your post-operative instruction handout!    Elevate the leg at the heart level 95% of the time for the first 2-3 days.    Limit the amount of time the foot and ankle are  down  for no more than 10 minutes at a time the first few days.    Ice consistently for the first 2-3 days.  o If on bare skin or a thin dressing, limit icing to 20 minutes per hour.  o If around a splint, apply the ice behind the knee for 20 minutes per hours or over the splint around the ankle as much as tolerated.    Do not plan extra activity for the first 2-3 days after surgery.    Expect the foot or ankle will be painful - surgery hurts!  The pain will get better.  Trust the process.    Non-opiate Medications  Start these medications right away after you get home from surgery and continue on a regular schedule for at least 3 days.  As you pain allows, start to use as needed until your first clinic visit after surgery.  It may be helpful to alternate the ibuprofen/Celebrex and Tylenol to maximize pain relief.  In rare cases, I may recommend avoiding ibuprofen or aleve after surgery - this will be made clear  at the time of surgery.    Motrin or Advil (ibuprofen) 600mg every 6 hours OR Celebrex 100mg every 12 hours.    Tylenol (acetaminophen) 650mg every 6 hours.      Neurontin (gabapentin) 300mg every 8 hours for the first 3 days only.      Hydroxyzine 25mg (or 10mg if >65 years of age) every 6 hours.    Opiate Medications  These medications should be used sparingly, just as needed, and for the first few days after surgery.  Please see the below guidelines for details.    Ultram (tramadol) 50mg, 1-2 tablets every 4 hours as needed.  OR    Oxycodone 5mg, 1-2 tablets every 3 hours as needed.    Opiate pain medications can be very effective, but carry a number of potentially harmful side effects including tolerance and addiction if used inappropriately.  They will be the most effective the first few days following surgery in the following situations:    If you had a nerve block before surgery, take one pill a few hours after you get home from the surgery center.  Keep to a regular schedule with the medication, taking just one pill every 4 hours until the block wears off (tingling, pins and needles, increased movement in the toes, increased pain, etc.).  Take 1-2 pills again 4 hours later.    Take 1-2 pills at bedtime the first few days after surgery to help sleep and limit pain through the night.    Take 1-2 pills for  rescue  when pain is not controlled by the regularly scheduled medications and non-medication interventions.    You should generally feel less need for the opiate pain medication after the first few days after surgery.  Remember, foot and ankle surgery hurts!  The goal of medication management is to  take the edge off  and keep the pain level tolerable.  Trust the process - the pain will get better!    Multiple studies indicate that opioid pain medication is not needed beyond a few days postoperatively and prolonged use of these medications can actually lead to increased perception of pain and  tolerance/addiction issues.  The CDC and state boards have been recommending and enforcing restrictions on opioid prescribing in light of the significant consequences that arise from chronic opioid use and [facility] is adopting many of these recommendations for your safety and well-being.      1. Gabriel et al, Satisfaction with Pain Relief After Operative Treatment of an Ankle Fracture, Injury. 2012; 43(11):1958-61.  2. Gabriel et al, Pain Relief After Operative Treatment of an Extremity Fracture, [facility]. 2017; 99:1908-15.  3. Meena et al, Support for Safer Opioid Prescribing Practices, [facility]. 2017; 99:1945-55.  4. Fady et al, Liposomal Bupivacaine in Forefoot Surgery, Foot Ankle Int. 2015; 36(5):503-7.  5. Judy et al, Addition of Pregabalin to Multimodal Analgesic Therapy Following Ankle Surgery, Reg Anesth Pain Med. 2012; 37(3):302-7.    Same Day Surgery Discharge Instructions for  Sedation and General Anesthesia       It's not unusual to feel dizzy, light-headed or faint for up to 24 hours after surgery or while taking pain medication.  If you have these symptoms: sit for a few minutes before standing and have someone assist you when you get up to walk or use the bathroom.      You should rest and relax for the next 24 hours. We recommend you make arrangements to have an adult stay with you for at least 24 hours after your discharge.  Avoid hazardous and strenuous activity.      DO NOT DRIVE any vehicle or operate mechanical equipment for 24 hours following the end of your surgery.  Even though you may feel normal, your reactions may be affected by the medication you have received.      Do not drink alcoholic beverages for 24 hours following surgery.       Slowly progress to your regular diet as you feel able. It's not unusual to feel nauseated and/or vomit after receiving anesthesia.  If you develop these symptoms, drink clear liquids (apple juice, ginger ale, broth, 7-up, etc. ) until you feel  better.  If your nausea and vomiting persists for 24 hours, please notify your surgeon.        All narcotic pain medications, along with inactivity and anesthesia, can cause constipation. Drinking plenty of liquids and increasing fiber intake will help.      For any questions of a medical nature, call your surgeon.      Do not make important decisions for 24 hours.      If you had general anesthesia, you may have a sore throat for a couple of days related to the breathing tube used during surgery.  You may use Cepacol lozenges to help with this discomfort.  If it worsens or if you develop a fever, contact your surgeon.       If you feel your pain is not well managed with the pain medications prescribed by your surgeon, please contact your surgeon's office to let them know so they can address your concerns.

## 2018-06-04 NOTE — IP AVS SNAPSHOT
MRN:[id number]                      After Visit Summary   6/4/2018    Moni Liriano    MRN: [id number]           Thank you!     Thank you for choosing [facility] for your care. Our goal is always to provide you with excellent care. Hearing back from our patients is one way we can continue to improve our services. Please take a few minutes to complete the written survey that you may receive in the mail after you visit with us. Thank you!        Patient Information     Date Of Birth          1960        About your hospital stay     You were admitted on:  June 4, 2018 You last received care in the:  [facility] PACU    You were discharged on:  June 4, 2018       Who to Call     For medical emergencies, please call 911.  For non-urgent questions about your medical care, please call your primary care provider or clinic, [phone]  For questions related to your surgery, please call your surgery clinic        Attending Provider     Provider Jax Ellis MD Orthopedics       Primary Care Provider Office Phone # Fax #    Gianna Smith [phone]      After Care Instructions     Diet Instructions       Resume pre-procedure diet            Discharge Instructions       The patient will follow-up in clinic with Dr. Boyce ([facility]; [phone]) in 2 weeks for wound check, suture removal, and NWB XR of the right foot.            Do not - immerse incision in water until sutures removed       Do not immerse incision in water until sutures removed            Dressing       Keep splint clean and dry.  Splint removed at 2 week post op.            Ice to affected area       Ice to operative site PRN            No Alcohol       For 24 hours post procedure            No driving or operating machinery        until the day after procedure            No weight bearing       NWB RLE                  Further instructions from your care team            Post-Operative Instruction Sheet for Foot and Ankle Surgery  Jax Boyce M.D.      These precautions MUST be followed for the first 24 hours after surgery:    Upon discharge, go directly home.    You MUST make arrangements for a responsible adult to stay with you the first night following surgery.  Surgery may be cancelled if you do not have someone that can stay with you.    DO NOT DRINK ALCOHOLIC BEVERAGES.    It is not unusual to feel lightheaded up to 24 hours after surgery or while taking pain medications.  If you feel lightheaded, sit up for a few minutes before standing and have someone assist you when you get up to walk or use the restroom.    Do not use any mechanical equipment or heavy machinery.    Do not make any important or legal decisions for 24 hours or while on pain medication.    You may experience dry mouth, sore throat, or sleep disturbances from the anesthesia and medications used during surgery.  Generalized muscle aches can sometimes occur.  These symptoms generally disappear by 24 hours.    The following are general guidelines and instructions about what to expect the first weeks following surgery.  These are not specific, and your recovery may be slightly different.  Please follow the instructions that are specifically written out for you after the surgery if there are any questions.    Pain Management   If you have received a nerve block, the pain relief can last anywhere from 12-30 hours, this also means you may not have sensation or movement in your foot for that amount of time. You will have pain after the block wears off!  Anticipate this and start pain meds prior to the block wearing off.     Take your first dose of the prescribed pain medication a few hours after you get home, even if you have no pain.     Continue to take your medications as prescribed for the first 24-48 hours - ensure that the patient (you) are alert and have no difficulty breathing before taking the  medication.  Often it may be helpful to set an alarm throughout the night to ensure you don t miss a dose of the medication and wake up with a lot more pain.    You can expect that the first two nights will be the most painful and uncomfortable. I will give you strong medication to make you as comfortable as possible, but you may still have some break-through pain.  This is not unexpected, as there are many nerve endings in the foot and ankle and many patients state the pain from foot surgery is worse than most other injuries or procedures!    After the first few days, take the medication as needed for pain.    As your pain improves, you can gradually decrease your pain meds by utilizing Tylenol or an anti-inflammatory medication.  You should also use these medications as directed early in the post-operative process to supplement the narcotic pain medication.    Dressing   Bleeding through the dressings is quite common.    This usually occurs for the first 1-2 hours after surgery. The actual bleeding has stopped by the time you see the drainage through your dressings.    You can reinforce the outside of the dressing with gauze and an Ace wrap unless otherwise directed.  In most cases, your first dressing change will be performed at your first clinic follow-up visit.  In some cases, I may allow you to change your dressings sooner.  Your dressing should be kept DRY at all times - do not shower, bathe, or wet your dressing in any way after surgery!    Wound Care  Once your stitches are removed, you can shower with soapy water and gently cleanse the incision if it is completely dry.     Do not shower or wash the incision(s) if parts of the incision(s) are open or still draining.    Do not soak the incision(s) in a bathtub or hot tub until the incision(s) is completely dry for one week.    Do not soak the incision(s) in lake or ocean water for at least one month post-operatively.    Elevation   I recommend strict  elevation of your foot above the level of your heart for 4-5 days.  Elevation of the foot remains important up to two weeks after surgery to limit swelling and help wound healing.    Elevate your foot/ankle to at least your waist level but above your heart would be best. The more you elevate your foot and ankle, the less pain you will have.     In the first few days following surgery, restrict the time your foot is  down  to 10 minutes or less at a time.    It is also good to keep your blood flowing through the operated leg and limit the risk of blood clots.  The first day following surgery, I would encourage you to get up and move around the house for a few minutes every hour and then return to elevating the foot.    After the strict elevation period (see above) is completed, you may gradually become more active. You should  listen  to your foot/ankle as to when to get off of it and elevate it again.  This may help even months after surgery.  Remember: avoid anything that hurts or makes your foot/ankle swell!    Icing   Icing can be very useful to decrease the pain and swelling of the foot.     Start by first placing a large garbage bag over the dressing.  Ice may then be placed around the extremity by using either bags of ice taped around the extremity   or you may place the extremity in a bucket filled with ice (the dressing MUST be covered with a plastic bag!).  Bags of frozen peas work well!    You should ice for no more than 20 minutes at a time and repeat at 2 hour intervals.     Do NOT place ice directly on your skin or dressing.     Activity     In most cases (unless otherwise instructed), you may not bear weight on your operated foot/ankle for 2 weeks after surgery.  That means the foot may not touch the ground when upright!  Specific weight bearing instructions for your surgery will be provided on the day of surgery.    Your toes may experience bruising after surgery and become darker when the foot hangs  down.  Unless you had surgery on those toes, it is important to actively wiggle your toes for 5-10 minutes each hour.    Many of your questions can be addressed at your 2-week follow-up appointment - please make a list of things to ask us as they come up during your recovery.    What to watch for      Severe swelling and/or pain in the calf: this could indicate a deep vein thrombosis (blood clot in leg) which requires urgent evaluation and treatment!    Profuse bleeding: that which soaks through your dressing and increases in size throughout the first day after surgery.    Blue or white toes: this indicates a lack of blood flow to the foot.     Fever greater than 101.5: fevers less than this are very common the first few days after surgery and are unlikely to indicate infection or any unexpected problem.    Severe pain: that which does not improve after pain medication, except for the first two nights.   If you have any of the above problems or any concerns, please contact my office ([phone]) and further instructions will be provided.  If you are unable to reach anyone or feel you have a medical emergency, please do not hesitate to go to the nearest urgent care or emergency department.    Medications   *Medications may take up to 24 hours to be refilled by my office.*    All pain medications, along with inactivity following surgery, can cause constipation.  Use the stool softeners as recommended, increase fluid intake to at least 1 quart per day, and increase your dietary fiber.  (The  p  fruits - peaches, plums, pears, and prunes - as well as anise/black licorice are generally helpful.)      Antibiotics may be prescribed to limit the risk of infection only in limited circumstances.  Kelfex (cephalexin) 500 mg - This is an antibiotic given in addition to the antibiotic given during surgery to help reduce the chance of post-operative infection.   Dosage: 1 tablet by mouth 4 times a day.  OR   Cleocin  (clindamycin) 600 mg - This is an antibiotic given to those patients who are allergic to penicillin in addition to the antibiotic given during surgery to help reduce the chance of post-operative infection.   Dosage: 1 tablet by mouth 3 times a day.      Anti-nausea and spasms  Hydroxyzine 25 mg  - This medicine should be taken if you experience any nausea or vomiting. If you know you are sensitive to narcotics please take 1 tablet 30 minutes prior to pain medication. This may also help with any mild itching experienced with the pain medication or muscle spasms.  Dosage: 1 tablet by mouth every 6 hours as needed for nausea, itching, spasms, or adjuvant pain control.      Pain medications (you will only be given a prescription for ONE of the following!)   Ultram (tramadol) 50mg - This is a pain medication that should be taken EVERY 4 TO 6 HOURS for pain relief. You should start the medication when you arrive home after surgery, before the nerve block wears off.  The first 1-2 nights you may need to take 2 tablets every 4 hours.  You should be given enough medication to last to the first office visit.  This medication cannot be refilled over the phone!  Dosage: 1-2 tablets every 4-6 hours as needed for pain relief.  OR  Oxycodone 5mg - This is a pain medication that may be taken EVERY 3 to 4 HOURS for pain relief.  You should start the medication when you arrive home after surgery, before the nerve block wears off.  The first 1-2 nights you may need to take up to 2 or even 3 tablets every 3-4 hours. You should be given enough medication to last to the first office visit.  This medication cannot be refilled over the phone!  Dosage: 1-2 (or 3) tablets every 3-4 hours as needed for pain relief.  OR  Norco (hydrocodone/acetominophen) 5/325 mg - This is a pain medication that should be taken EVERY 4 TO 6 HOURS for pain relief. You should start the medication when you arrive home after surgery, before the nerve block wears  off.  The first 1-2 nights you may need to take 2 tablets every 4 hours.  You should be given enough medication to last to the first office visit.  This medication cannot be refilled over the phone!  Dosage: 1-2 tablets every 4-6 hours as needed for pain relief.  *Do NOT take any Tylenol while taking this medication!  You may alternate Tylenol with this medication provided you do not take greater than 3 grams of Tylenol in total over a 24 hour time period.      Blood thinner  Depending on the type of surgery and your personal risk factors, I may prescribe a medication to help limit the risk of developing a blood clot after your surgery.  The length of time to take the recommended medication will be provided and typically lasts until you are moving about normally or bearing weight on the operated foot/ankle.  ECASA (enteric coated aspirin) 325mg tablet daily.  Lovenox (enoxaparin) 40mg subcutaneous injection daily.  Xarelto 10mg tablet daily.      Stool Softener  Take an over the counter stool softener such as senna or Miralax starting the day after your surgery to prevent constipation. This is a common side effect of the narcotic pain medications.  You may stop taking this after you have regular bowel movements or no longer require use of narcotic pain medications.    Dental Implications  Dental procedures should be avoided until your incisions are healed. Furthermore, surgical procedures including hardware or an allograft require taking an antibiotic within one hour of all dental work within 6 months of surgery. Total ankle replacements require indefinite use of antibiotics prior to dental procedures. We would be happy to provide you with the necessary prescription upon request.    Follow-up Visits  You should have your initial post-operative visits already scheduled but if you do not recall the exact dates or do not believe they have been scheduled, please contact [facility] right away to ensure that we have the  appropriate visits in the system.    You will likely follow-up with my physician assistant for your first post-operative visit and for a few of the additional follow-up visits.  He works directly with me on all patients and will be able to inform me if there are any concerns during your recovery process!        You can often find additional information about your procedure or condition on the [facility] website at [email or web address] or [email or web address].    Additional information from reputable orthopaedic foot and ankle surgeons affiliated with the American Orthopaedic Foot and Ankle Society can be found at [email or web address].    Post-operative Foot and Ankle Surgery Instructions and Tips for Pain Control  Dr. Boyce, [facility]    Non-medication Interventions    Read your post-operative instruction handout!    Elevate the leg at the heart level 95% of the time for the first 2-3 days.    Limit the amount of time the foot and ankle are  down  for no more than 10 minutes at a time the first few days.    Ice consistently for the first 2-3 days.  o If on bare skin or a thin dressing, limit icing to 20 minutes per hour.  o If around a splint, apply the ice behind the knee for 20 minutes per hours or over the splint around the ankle as much as tolerated.    Do not plan extra activity for the first 2-3 days after surgery.    Expect the foot or ankle will be painful - surgery hurts!  The pain will get better.  Trust the process.    Non-opiate Medications  Start these medications right away after you get home from surgery and continue on a regular schedule for at least 3 days.  As you pain allows, start to use as needed until your first clinic visit after surgery.  It may be helpful to alternate the ibuprofen/Celebrex and Tylenol to maximize pain relief.  In rare cases, I may recommend avoiding ibuprofen or aleve after surgery - this will be made clear  at the time of surgery.    Motrin or Advil (ibuprofen) 600mg every 6 hours OR Celebrex 100mg every 12 hours.    Tylenol (acetaminophen) 650mg every 6 hours.      Neurontin (gabapentin) 300mg every 8 hours for the first 3 days only.      Hydroxyzine 25mg (or 10mg if >65 years of age) every 6 hours.    Opiate Medications  These medications should be used sparingly, just as needed, and for the first few days after surgery.  Please see the below guidelines for details.    Ultram (tramadol) 50mg, 1-2 tablets every 4 hours as needed.  OR    Oxycodone 5mg, 1-2 tablets every 3 hours as needed.    Opiate pain medications can be very effective, but carry a number of potentially harmful side effects including tolerance and addiction if used inappropriately.  They will be the most effective the first few days following surgery in the following situations:    If you had a nerve block before surgery, take one pill a few hours after you get home from the surgery center.  Keep to a regular schedule with the medication, taking just one pill every 4 hours until the block wears off (tingling, pins and needles, increased movement in the toes, increased pain, etc.).  Take 1-2 pills again 4 hours later.    Take 1-2 pills at bedtime the first few days after surgery to help sleep and limit pain through the night.    Take 1-2 pills for  rescue  when pain is not controlled by the regularly scheduled medications and non-medication interventions.    You should generally feel less need for the opiate pain medication after the first few days after surgery.  Remember, foot and ankle surgery hurts!  The goal of medication management is to  take the edge off  and keep the pain level tolerable.  Trust the process - the pain will get better!    Multiple studies indicate that opioid pain medication is not needed beyond a few days postoperatively and prolonged use of these medications can actually lead to increased perception of pain and  tolerance/addiction issues.  The CDC and state boards have been recommending and enforcing restrictions on opioid prescribing in light of the significant consequences that arise from chronic opioid use and [facility] is adopting many of these recommendations for your safety and well-being.      1. Gabriel et al, Satisfaction with Pain Relief After Operative Treatment of an Ankle Fracture, Injury. 2012; 43(11):1958-61.  2. Gabriel et al, Pain Relief After Operative Treatment of an Extremity Fracture, [facility]. 2017; 99:1908-15.  3. Meena et al, Support for Safer Opioid Prescribing Practices, [facility]. 2017; 99:1945-55.  4. Fady et al, Liposomal Bupivacaine in Forefoot Surgery, Foot Ankle Int. 2015; 36(5):503-7.  5. Judy et al, Addition of Pregabalin to Multimodal Analgesic Therapy Following Ankle Surgery, Reg Anesth Pain Med. 2012; 37(3):302-7.    Same Day Surgery Discharge Instructions for  Sedation and General Anesthesia       It's not unusual to feel dizzy, light-headed or faint for up to 24 hours after surgery or while taking pain medication.  If you have these symptoms: sit for a few minutes before standing and have someone assist you when you get up to walk or use the bathroom.      You should rest and relax for the next 24 hours. We recommend you make arrangements to have an adult stay with you for at least 24 hours after your discharge.  Avoid hazardous and strenuous activity.      DO NOT DRIVE any vehicle or operate mechanical equipment for 24 hours following the end of your surgery.  Even though you may feel normal, your reactions may be affected by the medication you have received.      Do not drink alcoholic beverages for 24 hours following surgery.       Slowly progress to your regular diet as you feel able. It's not unusual to feel nauseated and/or vomit after receiving anesthesia.  If you develop these symptoms, drink clear liquids (apple juice, ginger ale, broth, 7-up, etc. ) until you feel  "better.  If your nausea and vomiting persists for 24 hours, please notify your surgeon.        All narcotic pain medications, along with inactivity and anesthesia, can cause constipation. Drinking plenty of liquids and increasing fiber intake will help.      For any questions of a medical nature, call your surgeon.      Do not make important decisions for 24 hours.      If you had general anesthesia, you may have a sore throat for a couple of days related to the breathing tube used during surgery.  You may use Cepacol lozenges to help with this discomfort.  If it worsens or if you develop a fever, contact your surgeon.       If you feel your pain is not well managed with the pain medications prescribed by your surgeon, please contact your surgeon's office to let them know so they can address your concerns.                     Pending Results     Date and Time Order Name Status Description    6/4/2018 1018 XR Surgery SHIMA Fluoro L/T 5 Min w Stills In process             Admission Information     Date & Time Provider Department Dept. Phone    6/4/2018 Jax Boyce MD [facility] PACU [phone]      Your Vitals Were     Blood Pressure Temperature Respirations Height Weight Last Period    130/87 96.9  F (36.1  C) (Temporal) 16 1.613 m (5' 3.5\") 92.6 kg (204 lb 1.6 oz) 03/01/2012    Pulse Oximetry BMI (Body Mass Index)                96% 35.59 kg/m2          [email or web address] Information     [employer] lets you send messages to your doctor, view your test results, renew your prescriptions, schedule appointments and more. To sign up, go to [email or web address] . Click on \"Log in\" on the left side of the screen, which will take you to the Welcome page. Then click on \"Sign up Now\" on the right side of the page.     You will be asked to enter the access code listed below, as well as some personal information. Please follow the directions to create your username and password.     Your access code is: " [email or web address]  Expires: 2018 10:37 AM     Your access code will  in 90 days. If you need help or a new code, please call your [facility] clinic or [phone].        Care EveryWhere ID     This is your Care EveryWhere ID. This could be used by other organizations to access your [facility] medical records  [id number]        Equal Access to Services     JOSE ANGEL SALAS : Hadii marilia martinez hadasho Sosohailali, waaxda luqadaha, qaybta kaalmada adeegyada, waxemanuel dillon haydiannen dineshyuan chaves laLudwinselin . [facility] [phone].    ATENCIÓN: Si habla español, tiene a sung disposición servicios gratuitos de asistencia lingüística. Llame al [phone].    We comply with applicable federal civil rights laws and [place] laws. We do not discriminate on the basis of race, color, national origin, age, disability, sex, sexual orientation, or gender identity.               Review of your medicines      START taking        Dose / Directions    aspirin 325 MG EC tablet   Used for:  Failure of joint fusion, subsequent encounter        Dose:  325 mg   Take 1 tablet (325 mg) by mouth daily   Quantity:  42 tablet   Refills:  0       gabapentin 300 MG capsule   Commonly known as:  NEURONTIN   Used for:  Failure of joint fusion, subsequent encounter        Dose:  300 mg   Take 1 capsule (300 mg) by mouth 3 times daily   Quantity:  9 capsule   Refills:  1       hydrOXYzine 25 MG tablet   Commonly known as:  ATARAX   Used for:  Failure of joint fusion, subsequent encounter        Dose:  25 mg   Take 1 tablet (25 mg) by mouth every 6 hours as needed for itching (nausea, muscle spasms and adjuvant pain.)   Quantity:  30 tablet   Refills:  0       senna-docusate 8.6-50 MG per tablet   Commonly known as:  SENOKOT-S;PERICOLACE   Used for:  Failure of joint fusion, subsequent encounter        Dose:  1-2 tablet   Take 1-2 tablets by mouth 2 times daily Take while on oral narcotics to prevent or treat constipation.   Quantity:  30 tablet   Refills:  0          CONTINUE these medicines which may have CHANGED, or have new prescriptions. If we are uncertain of the size of tablets/capsules you have at home, strength may be listed as something that might have changed.        Dose / Directions    acetaminophen 325 MG tablet   Commonly known as:  TYLENOL   This may have changed:    - medication strength  - how much to take  - reasons to take this   Used for:  Failure of joint fusion, subsequent encounter        Dose:  650 mg   Take 2 tablets (650 mg) by mouth every 4 hours as needed for other (mild pain)   Quantity:  100 tablet   Refills:  0       * OXYCONTIN PO   This may have changed:  Another medication with the same name was changed. Make sure you understand how and when to take each.        Dose:  10 mg   Take 10 mg by mouth every 12 hours   Refills:  0       * oxyCODONE IR 5 MG tablet   Commonly known as:  ROXICODONE   This may have changed:    - medication strength  - how much to take  - when to take this  - reasons to take this   Used for:  Failure of joint fusion, subsequent encounter        Dose:  5-15 mg   Take 1-3 tablets (5-15 mg) by mouth every 3 hours as needed for pain or other (Moderate to Severe)   Quantity:  60 tablet   Refills:  0       * Notice:  This list has 2 medication(s) that are the same as other medications prescribed for you. Read the directions carefully, and ask your doctor or other care provider to review them with you.      CONTINUE these medicines which have NOT CHANGED        Dose / Directions    albuterol 108 (90 Base) MCG/ACT Inhaler   Commonly known as:  PROAIR HFA/PROVENTIL HFA/VENTOLIN HFA        Dose:  2 puff   Inhale 2 puffs into the lungs every 4 hours as needed for shortness of breath / dyspnea or wheezing   Refills:  0       ARIPIPRAZOLE PO        Dose:  2 mg   Take 2 mg by mouth daily   Refills:  0       ascorbic acid 250 MG Chew chewable tablet   Commonly known as:  vitamin C        Dose:  250 mg   Take 250 mg by mouth daily    Refills:  0       BIOTIN PO        Dose:  2.5 mg   Take 2.5 mg by mouth   Refills:  0       CLINDAMYCIN HCL PO        Dose:  600 mg   Take 600 mg by mouth once (As needed.)   Refills:  0       CLONAZEPAM PO        Dose:  1.5 mg   Take 1.5 mg by mouth At Bedtime (1-2 tablets nightly)   Refills:  0       COLACE PO        Dose:  100 mg   Take 100 mg by mouth daily   Refills:  0       cyanocobalamin 1000 MCG/ML injection   Commonly known as:  VITAMIN B12        Dose:  1 mL   Inject 1 mL into the muscle every 14 days   Refills:  0       CYCLOBENZAPRINE HCL PO        Dose:  10 mg   Take 10 mg by mouth At Bedtime   Refills:  0       CYMBALTA PO        Dose:  120 mg   Take 120 mg by mouth every morning (takes 2 X 60 mg = 120 mg dose)   Refills:  0       estradiol 0.1 MG/GM cream   Commonly known as:  ESTRACE        Dose:  1 g   Place 1 g vaginally every 21 days (May repeat course every 3-4 months)   Refills:  0       FEXOFENADINE HCL PO        Dose:  180 mg   Take 180 mg by mouth daily   Refills:  0       fluticasone 50 MCG/ACT spray   Commonly known as:  FLONASE        Dose:  2 spray   Spray 2 sprays into both nostrils daily   Refills:  0       fluticasone-salmeterol 250-50 MCG/DOSE diskus inhaler   Commonly known as:  ADVAIR        Dose:  1 puff   Inhale 1 puff into the lungs every 12 hours (rinse and gargle after use)   Refills:  0       FOLIC ACID PO        Dose:  3 mg   Take 3 mg by mouth daily (takes 3 X 1 mg = 3 mg dose)   Refills:  0       GLYCOLAX PO        Dose:  17 g   Take 17 g by mouth 2 times daily as needed   Refills:  0       guaiFENesin 600 MG 12 hr tablet   Commonly known as:  MUCINEX        Dose:  1200 mg   Take 1,200 mg by mouth 2 times daily (takes 2 X 600 mg = 1,200 mg dose)   Refills:  0       LAMICTAL PO        Dose:  300 mg   Take 300 mg by mouth At Bedtime (takes 2 X 150 mg = 300 mg dose)   Refills:  0       lidocaine visc 2% 2.5mL/5mL & maalox/mylanta w/ simeth 2.5mL/5mL & diphenhydrAMINE  5mg/5mL Susp suspension   Commonly known as:  [facility] Mouthwash [facility]        Dose:  10 mL   Swish and swallow 10 mLs in mouth every 4 hours as needed for mouth sores   Refills:  0       meclizine 12.5 MG tablet   Commonly known as:  ANTIVERT        Dose:  12.5 mg   Take 1 tablet (12.5 mg) by mouth 4 times daily as needed for dizziness   Quantity:  30 tablet   Refills:  0       Multi-vitamin Tabs tablet        Dose:  1 tablet   Take 1 tablet by mouth daily   Refills:  0       NAPROXEN PO        Dose:  500 mg   Take 500 mg by mouth 2 times daily as needed   Refills:  0       NONFORMULARY        Aluminum and magnesium, diphenhydramine,lidacine,sucrafate suspension   Refills:  0       OMEPRAZOLE PO        Dose:  20 mg   Take 20 mg by mouth daily (before a meal)   Refills:  0       TRAZODONE HCL PO        Dose:  300 mg   Take 300 mg by mouth At Bedtime (takes 2 X 150 mg = 300 mg dose)   Refills:  0       VITAMIN D (CHOLECALCIFEROL) PO        Dose:  4000 Units   Take 4,000 Units by mouth   Refills:  0         STOP taking     IBUPROFEN PO                Where to get your medicines      These medications were sent to [facility] - [address]  [address] [facility] [address], [place] [address]     Phone:  [phone]     aspirin 325 MG EC tablet    gabapentin 300 MG capsule    hydrOXYzine 25 MG tablet    senna-docusate 8.6-50 MG per tablet         Some of these will need a paper prescription and others can be bought over the counter. Ask your nurse if you have questions.     Bring a paper prescription for each of these medications     oxyCODONE IR 5 MG tablet       You don't need a prescription for these medications     acetaminophen 325 MG tablet                Protect others around you: Learn how to safely use, store and throw away your medicines at www.disposemymeds.org.        Information about OPIOIDS     PRESCRIPTION OPIOIDS: WHAT YOU NEED TO KNOW   You have a prescription for an opioid  (narcotic) pain medicine. Opioids can cause addiction. If you have a history of chemical dependency of any type, you are at a higher risk of becoming addicted to opioids. Only take this medicine after all other options have been tried. Take it for as short a time and as few doses as possible.     Do not:    Drive. If you drive while taking these medicines, you could be arrested for driving under the influence (DUI).    Operate heavy machinery    Do any other dangerous activities while taking these medicines.     Drink any alcohol while taking these medicines.      Take with any other medicines that contain acetaminophen. Read all labels carefully. Look for the word  acetaminophen  or  Tylenol.  Ask your pharmacist if you have questions or are unsure.    Store your pills in a secure place, locked if possible. We will not replace any lost or stolen medicine. If you don t finish your medicine, please throw away (dispose) as directed by your pharmacist. The [place] [employer] has more information about safe disposal: https://www.pca.[facility].us/living-green/managing-unwanted-medications    All opioids tend to cause constipation. Drink plenty of water and eat foods that have a lot of fiber, such as fruits, vegetables, prune juice, apple juice and high-fiber cereal. Take a laxative (Miralax, milk of magnesia, Colace, Senna) if you don t move your bowels at least every other day.              Medication List: This is a list of all your medications and when to take them. Check marks below indicate your daily home schedule. Keep this list as a reference.      Medications           Morning Afternoon Evening Bedtime As Needed    acetaminophen 325 MG tablet   Commonly known as:  TYLENOL   Take 2 tablets (650 mg) by mouth every 4 hours as needed for other (mild pain)                                albuterol 108 (90 Base) MCG/ACT Inhaler   Commonly known as:  PROAIR HFA/PROVENTIL HFA/VENTOLIN HFA   Inhale 2 puffs  into the lungs every 4 hours as needed for shortness of breath / dyspnea or wheezing                                ARIPIPRAZOLE PO   Take 2 mg by mouth daily                                ascorbic acid 250 MG Chew chewable tablet   Commonly known as:  vitamin C   Take 250 mg by mouth daily                                aspirin 325 MG EC tablet   Take 1 tablet (325 mg) by mouth daily                                BIOTIN PO   Take 2.5 mg by mouth                                CLINDAMYCIN HCL PO   Take 600 mg by mouth once (As needed.)                                CLONAZEPAM PO   Take 1.5 mg by mouth At Bedtime (1-2 tablets nightly)                                COLACE PO   Take 100 mg by mouth daily                                cyanocobalamin 1000 MCG/ML injection   Commonly known as:  VITAMIN B12   Inject 1 mL into the muscle every 14 days                                CYCLOBENZAPRINE HCL PO   Take 10 mg by mouth At Bedtime                                CYMBALTA PO   Take 120 mg by mouth every morning (takes 2 X 60 mg = 120 mg dose)                                estradiol 0.1 MG/GM cream   Commonly known as:  ESTRACE   Place 1 g vaginally every 21 days (May repeat course every 3-4 months)                                FEXOFENADINE HCL PO   Take 180 mg by mouth daily                                fluticasone 50 MCG/ACT spray   Commonly known as:  FLONASE   Spray 2 sprays into both nostrils daily                                fluticasone-salmeterol 250-50 MCG/DOSE diskus inhaler   Commonly known as:  ADVAIR   Inhale 1 puff into the lungs every 12 hours (rinse and gargle after use)                                FOLIC ACID PO   Take 3 mg by mouth daily (takes 3 X 1 mg = 3 mg dose)                                gabapentin 300 MG capsule   Commonly known as:  NEURONTIN   Take 1 capsule (300 mg) by mouth 3 times daily                                GLYCOLAX PO   Take 17 g by mouth 2 times daily as needed                                 guaiFENesin 600 MG 12 hr tablet   Commonly known as:  MUCINEX   Take 1,200 mg by mouth 2 times daily (takes 2 X 600 mg = 1,200 mg dose)                                hydrOXYzine 25 MG tablet   Commonly known as:  ATARAX   Take 1 tablet (25 mg) by mouth every 6 hours as needed for itching (nausea, muscle spasms and adjuvant pain.)                                LAMICTAL PO   Take 300 mg by mouth At Bedtime (takes 2 X 150 mg = 300 mg dose)                                lidocaine visc 2% 2.5mL/5mL & maalox/mylanta w/ simeth 2.5mL/5mL & diphenhydrAMINE 5mg/5mL Susp suspension   Commonly known as:  MAGIC Mouthwash [facility]   Swish and swallow 10 mLs in mouth every 4 hours as needed for mouth sores                                meclizine 12.5 MG tablet   Commonly known as:  ANTIVERT   Take 1 tablet (12.5 mg) by mouth 4 times daily as needed for dizziness                                Multi-vitamin Tabs tablet   Take 1 tablet by mouth daily                                NAPROXEN PO   Take 500 mg by mouth 2 times daily as needed                                NONFORMULARY   Aluminum and magnesium, diphenhydramine,lidacine,sucrafate suspension                                OMEPRAZOLE PO   Take 20 mg by mouth daily (before a meal)                                * OXYCONTIN PO   Take 10 mg by mouth every 12 hours                                * oxyCODONE IR 5 MG tablet   Commonly known as:  ROXICODONE   Take 1-3 tablets (5-15 mg) by mouth every 3 hours as needed for pain or other (Moderate to Severe)   Last time this was given:  5 mg on 6/4/2018 10:30 AM                                senna-docusate 8.6-50 MG per tablet   Commonly known as:  SENOKOT-S;PERICOLACE   Take 1-2 tablets by mouth 2 times daily Take while on oral narcotics to prevent or treat constipation.                                TRAZODONE HCL PO   Take 300 mg by mouth At Bedtime (takes 2 X 150 mg = 300 mg dose)                                 VITAMIN D (CHOLECALCIFEROL) PO   Take 4,000 Units by mouth                                * Notice:  This list has 2 medication(s) that are the same as other medications prescribed for you. Read the directions carefully, and ask your doctor or other care provider to review them with you.

## 2018-06-04 NOTE — OP NOTE
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    PROCEDURE(S):   1. Right revision medial facet naviculocuneiform arthrodesis.  2. Right naviculocuneiform hardware removal.    ATTENDING SURGEON: Dr. Jax Boyce.    ASSISTANT SURGEON: Hilton Augustin PA-C.    ANESTHESIA: Sedation with regional nerve block.    EBL: Minimal.    IMPLANTS: Colcord 28 V92 graft, Colcord 28 2-hole compression plate with associated interlocking screws.    COMPLICATIONS: None apparent.    A skilled surgical assistant, Hilton Augustin PA-C, was necessary and utilized during the case to assist with patient positioning, prepping and draping, completing the soft tissue/bone work, wound closure, and dressing/splint application.  The assistant was utilized throughout the entire case.    INDICATIONS: Moni is a pleasant 57 year-old female well known to my clinic after having undergone a right naviculocuneiform arthrodesis.  The patient healed her middle facet but demonstrated early hardware failure at the medial facet with just a very small portion of bridging bone directly under her medial staple noted on subsequent imaging studies.  The patient demonstrated persistent pain localized over the medial aspect of the midfoot, suspected to be associated with incomplete healing across the medial facet arthrodesis as well as potentially some pain from the surgical hardware.  The patient attempted multiple conservative treatments including use of a bone stimulator without improved healing across the medial facet.  Given the patient's failure of conservative treatments, the above operative intervention was offered.  After full discussion of the benefits and risks of surgery, the patient provided informed consent to proceed.    The patient was identified in the pre-operative holding area on the date of surgery.  The  operative site was marked with indelible marker and the patient was brought back to the operating room and transferred to the operating table in a supine position.  All bony prominences were well-padded.  Anesthesia was administered without complication.  The right lower extremity was prepped and draped in standard sterile fashion.  A pre-operative timeout was performed identifying the correct patient, procedure, operative site, antibiotic administration, and equipment necessary for the procedure.    After Esmarch exsanguination, a supramalleolar tourniquet was secured.  The patient's previous surgical incision was utilized and soft tissue dissection was carefully carried down maintaining excellent hemostasis.  The middle facet staple was encountered and removed without difficulty.  Excellent healing was noted across the middle facet of the naviculocuneiform joint.  Soft tissue dissection was then carried down medially and the broken medial facet staple was identified.  The staple was removed without complication.  There did appear to be a very small area of bridging bone across the very dorsal aspect of the joint though I was able to easily break apart the joint and access the remainder of the joint where the fusion had not taken.  Fibrous tissue within the nonunion site was carefully debrided.  A 2.0mm fenestrating drill bit was utilized to fenestrate the subchondral bone on both sides of the joint to prepare the joint for arthrodesis.  V92 graft was then impacted into the joint to enhance arthrodesis.  The medial facet was secured with a 2-hole compression plate utilizing 3.5 mm interlocking screws.  Excellent compression was noted across the medial facet of the naviculocuneiform joint with appropriate positioning of the hardware confirmed under fluoroscopic imaging.  Incidentally, while advancing the drill bit through the navicular for the proximal screw hole, the bit pushed the broken staple donnell into the  plantar and lateral navicular.  Final fluoroscopic images did not reveal any evidence of the broken donnell advancing into a nearby joint or the donnell resting in a position where I would suspect it should cause any significant soft tissue irritation. The wound was copiously irrigated.  Deep fascia was reapproximated with 2-0 Vicryl suture.  Subcutaneous tissues and skin were reapproximated with interrupted 3-0 Monocryl and 3-0 nylon sutures respectively.  Xeroform and sterile dressings were applied.  The patient was placed in a short leg splint.  The patient was brought to the PACU in stable condition for further postoperative care.    Postoperatively, the patient will remain strictly nonweightbearing on the right lower extremity.  The patient will be placed on aspirin for DVT prophylaxis postoperatively.  The patient will maintain her current OxyContin dose and we will increase her oxycodone dose for the first few days postoperatively to address her more acute surgical pain.  The patient will be placed on a short course of gabapentin and was also encouraged to utilize Tylenol, NSAIDs, and hydroxyzine to assist with pain relief.  The patient will continue working with her pain clinic for her more chronic opioid management as we get through the first few weeks postoperatively.  The patient will return to clinic for follow-up in 2 weeks for repeat evaluation including wound check, suture removal, and nonweightbearing radiographs of the right foot.    Of note, all counts were correct at the conclusion of the case.

## 2018-06-04 NOTE — ANESTHESIA CARE TRANSFER NOTE
Patient: Moni Liriano    Procedure(s):  RIGHT REVISION MEDIAL NAVICULOCUNEIFORM FUSION,HARDWARE REMOVAL - Wound Class: I-Clean   - Wound Class: I-Clean    Diagnosis: RIGHT MEDIAL NAVICULO CUNEIFORM NON UNION  Diagnosis Additional Information: No value filed.    Anesthesia Type:   Peripheral Nerve Block     Note:  Airway :Nasal Cannula  Patient transferred to:PACU  Comments: At end of procedure, spontaneous respirations, patient alert to voice, able to follow commands. Oxygen via nasal cannula at 3 liters per minute to PACU. Oxygen tubing connected to wall O2 in PACU, SpO2, NiBP, and EKG monitors and alarms on and functioning, Guillermo Hugger warmer connected to patient gown, report on patient's clinical status given to PACU RN, RN questions answered.Handoff Report: Identifed the Patient, Identified the Reponsible Provider, Reviewed the pertinent medical history, Discussed the surgical course, Reviewed Intra-OP anesthesia mangement and issues during anesthesia, Set expectations for post-procedure period and Allowed opportunity for questions and acknowledgement of understanding      Vitals: (Last set prior to Anesthesia Care Transfer)    CRNA VITALS  6/4/2018 0937 - 6/4/2018 1012      6/4/2018             NIBP: 95/76    Pulse: 68    NIBP Mean: 83    SpO2: 95 %    Resp Rate (set): 10                Electronically Signed By: DU Landin CRNA  June 4, 2018  10:12 AM

## 2018-06-04 NOTE — ADDENDUM NOTE
Addendum  created 06/04/18 1721 by Elmo Hinson MD    Anesthesia Intra Blocks edited, Sign clinical note

## 2018-06-04 NOTE — ANESTHESIA PROCEDURE NOTES
Peripheral nerve/Neuraxial procedure note : Popliteal and Sciatic (Sciatic in the Popliteal Fossa)  Pre-Procedure  Performed by LARA SMITH  Location: pre-op    Procedure Times:6/4/2018 8:32 AM and 6/4/2018 8:45 AM  Pre-Anesthestic Checklist: patient identified, IV checked, site marked, risks and benefits discussed, informed consent, monitors and equipment checked, pre-op evaluation and at physician/surgeon's request    Timeout  Correct Patient: Yes   Correct Procedure: Yes   Correct Site: Yes   Correct Laterality: Yes   Correct Position: Yes   Site Marked: Yes   .   Procedure Documentation    .    Procedure:  right  Popliteal and Sciatic (Sciatic in the Popliteal Fossa).  Local skin infiltrated with 3 mL of 1% lidocaine.     Ultrasound used to identify targeted nerve, plexus, or vascular marker and placed a needle adjacent to it., Ultrasound was used to visualize the spread of the anesthetic in close proximity to the above stated nerve. A permanent image is entered into the patient's record.  Patient Prep;mask, chlorhexidine gluconate and isopropyl alcohol.  .  Needle: insulated, short bevel (Arrow) Needle Gauge: 22.  Needle Length (millimeters) 90  Insertion Method: Single Shot.       Assessment/Narrative  Paresthesias: No.  Injection made incrementally with aspirations every 3 mL..  The placement was negative for: blood aspirated, painful injection and site bleeding.  Bolus given via needle..   Secured via.   Complications: none. Test dose of mL at. Test dose negative for signs of intravascular, subdural or intrathecal injection. Comments:  Sciatic Nerve Block in the Popliteal Fossa via lateral thigh approach.    30 cc 0.5% Bupivacaine + 1:400K Epi - no paresthesias or s/s IV injection.  Good spread confirmed with U/S and trace-down technique.    Pt mildly sedated, but communicative throughout procedure.   Pt tolerated well.     The surgeon has given a verbal order transferring care of this patient to  me for the performance of a regional analgesia block for post-op pain control. It is requested of me because I am uniquely trained and qualified to perform this block and the surgeon is neither trained nor qualified to perform this procedure.    Note the times listed above encompass the times for both nerve blocks.

## 2018-06-04 NOTE — ANESTHESIA PROCEDURE NOTES
Peripheral nerve/Neuraxial procedure note : Saphenous  Pre-Procedure  Performed by LARA SMITH  Location: pre-op      Pre-Anesthestic Checklist: patient identified, IV checked, site marked, risks and benefits discussed, informed consent, monitors and equipment checked, pre-op evaluation and at physician/surgeon's request    Timeout  Correct Patient: Yes   Correct Procedure: Yes   Correct Site: Yes   Correct Laterality: Yes   Correct Position: Yes   Site Marked: Yes   .   Procedure Documentation    .    Procedure:  right  Saphenous.  Insertion site: below-the knee perivenous technique. Local skin infiltrated with 1 mL of 1% lidocaine.     Ultrasound used to identify targeted nerve, plexus, or vascular marker and placed a needle adjacent to it., Ultrasound was used to visualize the spread of the anesthetic in close proximity to the above stated nerve. A permanent image is entered into the patient's record.  Patient Prep;mask, sterile gloves, chlorhexidine gluconate and isopropyl alcohol.  Nerve Stim: Initial Level 1.5 mA. Lowest motor response mA..  Needle: insulated, short bevel Needle Gauge: 22.  Needle Length (millimeters) 80  Insertion Method: Single Shot.       Assessment/Narrative  : only with nerve stimulation at 1.5 mA - none after turned off prior to injection or during injection.  Injection made incrementally with aspirations every 3 mL..  The placement was negative for: blood aspirated, painful injection and site bleeding.  Bolus given via needle..   Secured via.   Complications: none. Comments:  Saphenous Nerve via Below-The-Knee Mackenzie-venous technique using U/S and nerve stimulation.   15 cc of 0.5% Bupivacaine with 1:400K Epi - no paresthesias or s/s of IV injection.   Pt mildly sedated, but communicative throughout procedure.   Pt tolerated well.     The surgeon has given a verbal order transferring care of this patient to me for the performance of a regional analgesia block for post-op pain  control. It is requested of me because I am uniquely trained and qualified to perform this block and the surgeon is neither trained nor qualified to perform this procedure.

## 2018-06-04 NOTE — ANESTHESIA PREPROCEDURE EVALUATION
Procedure: Procedure(s):  ARTHRODESIS FOOT  REMOVE HARDWARE FOOT  Preop diagnosis: RIGHT MEDIAL NAVICULO CUNEIFORM NON UNION    Allergies   Allergen Reactions     Contrast Dye Unknown     Shellfish Allergy Swelling and Rash     Swelling of eyes, arms, throat tightness     No Clinical Screening - See Comments      Cats,dogs,pollen,mold,trees     Penicillins      Yeast infection     Past Medical History:   Diagnosis Date     Anemia     iron deficiency     Anxiety      Arthritis     osteo     Autoimmune disorder (H)      Bipolar 1 disorder (H)      Blood transfusion      Borderline diabetes      Clotting disorder (H)      COPD (chronic obstructive pulmonary disease) (H)      Depression      Glucose intolerance (impaired glucose tolerance)      History of blood transfusion      Hypercholesterolemia      Insomnia      Leiomyoma of uterus      Neck pain      Obese      Other chronic pain      Uncomplicated asthma      Past Surgical History:   Procedure Laterality Date     APPENDECTOMY       ARTHROPLASTY KNEE UNICOMPARTMENT  10/22/2013    Procedure: ARTHROPLASTY KNEE UNICOMPARTMENT;  Left partial medial knee arthroplasty;  Surgeon: Neil Kuhn MD;  Location:  OR     C/SECTION, LOW TRANSVERSE       CHOLECYSTECTOMY       DILATION AND CURETTAGE, ABLATE ENDOMETRIUM NOVASURE, COMBINED  5/3/2012    Procedurelaparoscopy,:COMBINED DILATION AND CURETTAGE, ABLATE ENDOMETRIUM NOVASURE; DILATION AND CURETTAGE, ABLATE ENDOMETRIUM NOVASURE ; Surgeon:EMMA HAYS; Location:[facility]     ENT SURGERY      wisdom teeth     GASTRIC BYPASS       GI SURGERY      knee scope     ORTHOPEDIC SURGERY      shoulder bilateral, BILATERAL TKA     TUBAL LIGATION       Social History   Substance Use Topics     Smoking status: Former Smoker     Packs/day: 0.50     Years: 20.00     Types: Cigarettes     Quit date: 1/1/2010     Smokeless tobacco: Never Used     Alcohol use No      Comment: NONE SINCE 2005     Prior to Admission medications     Medication Sig Start Date End Date Taking? Authorizing Provider   Acetaminophen (TYLENOL PO) Take 1,000 mg by mouth every 4 hours as needed for mild pain or fever   Yes Reported, Patient   albuterol (PROAIR HFA/PROVENTIL HFA/VENTOLIN HFA) 108 (90 Base) MCG/ACT Inhaler Inhale 2 puffs into the lungs every 4 hours as needed for shortness of breath / dyspnea or wheezing    Reported, Patient   ARIPIPRAZOLE PO Take 2 mg by mouth daily   Yes Reported, Patient   ascorbic acid (VITAMIN C) 250 MG CHEW chewable tablet Take 250 mg by mouth daily   Yes Reported, Patient   BIOTIN PO Take 2.5 mg by mouth   Yes Reported, Patient   CLINDAMYCIN HCL PO Take 600 mg by mouth once (As needed.)    Reported, Patient   CLONAZEPAM PO Take 1.5 mg by mouth At Bedtime (1-2 tablets nightly)   Yes Reported, Patient   cyanocobalamin 1000 MCG/ML injection Inject 1 mL into the muscle every 14 days    Yes Reported, Patient   CYCLOBENZAPRINE HCL PO Take 10 mg by mouth At Bedtime     Reported, Patient   Docusate Sodium (COLACE PO) Take 100 mg by mouth daily    Reported, Patient   DULoxetine HCl (CYMBALTA PO) Take 120 mg by mouth every morning (takes 2 X 60 mg = 120 mg dose)   Yes Reported, Patient   estradiol (ESTRACE) 0.1 MG/GM cream Place 1 g vaginally every 21 days (May repeat course every 3-4 months)    Reported, Patient   FEXOFENADINE HCL PO Take 180 mg by mouth daily    Yes Reported, Patient   fluticasone (FLONASE) 50 MCG/ACT spray Spray 2 sprays into both nostrils daily    Yes Reported, Patient   fluticasone-salmeterol (ADVAIR) 250-50 MCG/DOSE diskus inhaler Inhale 1 puff into the lungs every 12 hours (rinse and gargle after use)   Yes Reported, Patient   FOLIC ACID PO Take 3 mg by mouth daily (takes 3 X 1 mg = 3 mg dose)   Yes Reported, Patient   guaiFENesin (MUCINEX) 600 MG 12 hr tablet Take 1,200 mg by mouth 2 times daily (takes 2 X 600 mg = 1,200 mg dose)   Yes Reported, Patient   IBUPROFEN PO Take 800 mg by mouth daily as needed for  moderate pain    Yes Reported, Patient   LamoTRIgine (LAMICTAL PO) Take 300 mg by mouth At Bedtime (takes 2 X 150 mg = 300 mg dose)   Yes Reported, Patient   magic mouthwash suspension (diphenhydrAMINE, lidocaine, aluminum-magnesium & simethicone) Swish and swallow 10 mLs in mouth every 4 hours as needed for mouth sores    Reported, Patient   meclizine (ANTIVERT) 12.5 MG tablet Take 1 tablet (12.5 mg) by mouth 4 times daily as needed for dizziness 2/18/17   Seth Red MD   multivitamin, therapeutic with minerals (MULTI-VITAMIN) TABS tablet Take 1 tablet by mouth daily   Yes Reported, Patient   NAPROXEN PO Take 500 mg by mouth 2 times daily as needed    Yes Reported, Patient   NONFORMULARY Aluminum and magnesium, diphenhydramine,lidacine,sucrafate suspension    Reported, Patient   OMEPRAZOLE PO Take 20 mg by mouth daily (before a meal)   Yes Reported, Patient   OxyCODONE HCl (OXYCONTIN PO) Take 10 mg by mouth every 12 hours    Yes Reported, Patient   OXYCODONE HCL PO Take 10 mg by mouth 3 times daily as needed   Yes Reported, Patient   Polyethylene Glycol 3350 (GLYCOLAX PO) Take 17 g by mouth 2 times daily as needed   Yes Reported, Patient   TRAZODONE HCL PO Take 300 mg by mouth At Bedtime (takes 2 X 150 mg = 300 mg dose)   Yes Reported, Patient   VITAMIN D, CHOLECALCIFEROL, PO Take 4,000 Units by mouth   Yes Reported, Patient     Current Facility-Administered Medications Ordered in Epic   Medication Dose Route Frequency Last Rate Last Dose     ceFAZolin (ANCEF) 1 g vial to attach to  ml bag for ADULT or 50 ml bag for PEDS  1 g Intravenous See Admin Instructions         ceFAZolin (ANCEF) intermittent infusion 2 g in 100 mL dextrose PRE-MIX  2 g Intravenous Pre-Op/Pre-procedure x 1 dose         No current [facility]-ordered outpatient prescriptions on file.       Wt Readings from Last 1 Encounters:   06/04/18 92.6 kg (204 lb 1.6 oz)     Temp Readings from Last 1 Encounters:   06/04/18 36.7  C (98  F) (Oral)      BP Readings from Last 6 Encounters:   06/04/18 134/82   02/18/17 (!) 151/96   04/24/16 127/89   08/25/14 120/82   12/05/13 120/76   10/24/13 130/74     Pulse Readings from Last 4 Encounters:   02/18/17 69   04/24/16 87     Resp Readings from Last 1 Encounters:   06/04/18 16     SpO2 Readings from Last 1 Encounters:   06/04/18 96%     Recent Labs   Lab Test  04/24/16   1510  10/24/13   0550  10/23/13   0620   NA  138   --    --    POTASSIUM  3.9   --    --    CHLORIDE  106   --    --    CO2  29   --    --    ANIONGAP  3   --    --    GLC  94  94  150*   BUN  16   --    --    CR  0.64   --   0.55   CHARMAINE  9.0   --    --      Recent Labs   Lab Test  04/24/16   1510  10/24/13   0550  10/23/13   0620   WBC  7.5   --    --    HGB  10.0*  12.2  12.2   PLT  172   --   165     Recent Labs   Lab Test  10/21/13   0830   ABO  A   [facility]   Pos     Recent Labs   Lab Test  04/24/16   1510   INR  0.93      Recent Labs   Lab Test  10/22/13   1230   HCG  Negative     RECENT LABS:     ECG: Sinus len, ? Old inferior infarct - no change from previous    STRESS NUC: 1/2017   Conclusions  Summary  Stress results:  There was no chest pain during stress.  ECG conclusions:  The stress ECG was negative for ischemia.  Perfusion imaging:  There were no perfusion defects.  Gated SPECT:  The calculated left ventricular ejection fraction was 77 %.  Left ventricular ejection fraction was within normal limits  by visual estimate.  There was no diagnostic evidence for left ventricular  regional abnormality.  Impressions  Normal study. Myocardial perfusion imaging was normal at  rest and with stress. Left ventricular systolic function  was normal. Based on this study, the annual cardiovascular  mortality rate is low (less than 1%).    Anesthesia Evaluation     . Pt has had prior anesthetic. Type: General and Regional    No history of anesthetic complications          ROS/MED HX    ENT/Pulmonary:     (+)Intermittent asthma COPD, , . .   (-) tobacco  use, sleep apnea and recent URI   Neurologic:      (-) seizures and CVA   Cardiovascular:     (+) Dyslipidemia, ----. : . . . :. .      (-) hypertension, orthopnea/PND, syncope, arrhythmias, irregular heartbeat/palpitations and valvular problems/murmurs   METS/Exercise Tolerance:     Hematologic:     (+) Anemia, History of Transfusion -      Musculoskeletal:   (+) arthritis, , , -       GI/Hepatic:     (+) Other GI/Hepatic s/p GI Bypass     (-) GERD and liver disease   Renal/Genitourinary:      (-) renal disease   Endo:     (+) Obesity, .   (-) Type II DM (Borderline DM), thyroid disease and chronic steroid usage   Psychiatric:     (+) psychiatric history anxiety and bipolar      Infectious Disease:         Malignancy:         Other:    (+) H/O Chronic Pain,                   Physical Exam  Normal systems: dental    Airway   Mallampati: II  TM distance: >3 FB  Neck ROM: full    Dental     Cardiovascular   Rhythm and rate: regular and normal  (-) no murmur    Pulmonary    breath sounds clear to auscultation(-) no wheezes                    Anesthesia Plan      History & Physical Review  History and physical reviewed and following examination; no interval change.    ASA Status:  3 .    NPO Status:  > 8 hours    Plan for Peripheral Nerve Block   PONV prophylaxis:  Ondansetron (or other 5HT-3)       Postoperative Care  Postoperative pain management:  Multi-modal analgesia and Peripheral nerve block (Single Shot).      Consents  Anesthetic plan, risks, benefits and alternatives discussed with:  Patient..

## 2018-06-04 NOTE — OR NURSING
PT. WOULD LIKE TO KEEP HER HARDWARE POST-OP.    PT. SEES MARIYA TARANGO AT [facility]- [phone]

## 2018-06-04 NOTE — ANESTHESIA POSTPROCEDURE EVALUATION
Patient: Moni Liriano    Procedure(s):  RIGHT REVISION MEDIAL NAVICULOCUNEIFORM FUSION,HARDWARE REMOVAL - Wound Class: I-Clean   - Wound Class: I-Clean    Diagnosis:RIGHT MEDIAL NAVICULO CUNEIFORM NON UNION  Diagnosis Additional Information: No value filed.    Anesthesia Type:  Peripheral Nerve Block    Note:  Anesthesia Post Evaluation    Patient location during evaluation: PACU  Patient participation: Able to fully participate in evaluation  Level of consciousness: awake  Pain management: adequate  Airway patency: patent  Cardiovascular status: acceptable  Respiratory status: acceptable  Hydration status: acceptable  PONV: none     Anesthetic complications: None          Last vitals:  Vitals:    06/04/18 1030 06/04/18 1045 06/04/18 1200   BP: 130/87 118/75 129/86   Resp: 16 18 16   Temp:  37  C (98.6  F)    SpO2: 96% 95% 96%         Electronically Signed By: Elmo Hinson MD  June 4, 2018  5:14 PM

## 2018-06-14 NOTE — ADDENDUM NOTE
Addendum  created 06/14/18 0806 by Elmo Hinson MD    Anesthesia Intra Blocks edited, Sign clinical note

## 2018-10-07 ENCOUNTER — APPOINTMENT (OUTPATIENT)
Dept: CT IMAGING | Facility: CLINIC | Age: 58
End: 2018-10-07
Attending: EMERGENCY MEDICINE
Payer: MEDICARE

## 2018-10-07 ENCOUNTER — HOSPITAL ENCOUNTER (EMERGENCY)
Facility: CLINIC | Age: 58
Discharge: HOME | End: 2018-10-07
Attending: EMERGENCY MEDICINE | Admitting: EMERGENCY MEDICINE
Payer: MEDICARE

## 2018-10-07 VITALS
RESPIRATION RATE: 18 BRPM | WEIGHT: 205 LBS | HEART RATE: 70 BPM | OXYGEN SATURATION: 95 % | BODY MASS INDEX: 36.32 KG/M2 | TEMPERATURE: 97.2 F | HEIGHT: 63 IN | SYSTOLIC BLOOD PRESSURE: 152 MMHG | DIASTOLIC BLOOD PRESSURE: 82 MMHG

## 2018-10-07 DIAGNOSIS — R51.9 NONINTRACTABLE HEADACHE, UNSPECIFIED CHRONICITY PATTERN, UNSPECIFIED HEADACHE TYPE: ICD-10-CM

## 2018-10-07 DIAGNOSIS — R42 DIZZINESS: ICD-10-CM

## 2018-10-07 LAB
ALBUMIN SERPL-MCNC: 3.6 G/DL (ref 3.4–5)
ALBUMIN UR-MCNC: NEGATIVE MG/DL
ALP SERPL-CCNC: 101 U/L (ref 40–150)
ALT SERPL W P-5'-P-CCNC: 15 U/L (ref 0–50)
ANION GAP SERPL CALCULATED.3IONS-SCNC: 8 MMOL/L (ref 3–14)
APPEARANCE UR: CLEAR
AST SERPL W P-5'-P-CCNC: 15 U/L (ref 0–45)
BASOPHILS # BLD AUTO: 0 10E9/L (ref 0–0.2)
BASOPHILS NFR BLD AUTO: 0.5 %
BILIRUB SERPL-MCNC: 0.3 MG/DL (ref 0.2–1.3)
BILIRUB UR QL STRIP: NEGATIVE
BUN SERPL-MCNC: 17 MG/DL (ref 7–30)
CALCIUM SERPL-MCNC: 8.6 MG/DL (ref 8.5–10.1)
CHLORIDE SERPL-SCNC: 110 MMOL/L (ref 94–109)
CO2 SERPL-SCNC: 24 MMOL/L (ref 20–32)
COLOR UR AUTO: YELLOW
CREAT SERPL-MCNC: 0.73 MG/DL (ref 0.52–1.04)
DIFFERENTIAL METHOD BLD: ABNORMAL
EOSINOPHIL # BLD AUTO: 0.3 10E9/L (ref 0–0.7)
EOSINOPHIL NFR BLD AUTO: 4.9 %
ERYTHROCYTE [DISTWIDTH] IN BLOOD BY AUTOMATED COUNT: 13.7 % (ref 10–15)
GFR SERPL CREATININE-BSD FRML MDRD: 82 ML/MIN/1.7M2
GLUCOSE SERPL-MCNC: 91 MG/DL (ref 70–99)
GLUCOSE UR STRIP-MCNC: NEGATIVE MG/DL
HCT VFR BLD AUTO: 38.6 % (ref 35–47)
HGB BLD-MCNC: 11.9 G/DL (ref 11.7–15.7)
HGB UR QL STRIP: NEGATIVE
IMM GRANULOCYTES # BLD: 0 10E9/L (ref 0–0.4)
IMM GRANULOCYTES NFR BLD: 0.2 %
KETONES UR STRIP-MCNC: NEGATIVE MG/DL
LEUKOCYTE ESTERASE UR QL STRIP: NEGATIVE
LYMPHOCYTES # BLD AUTO: 1.6 10E9/L (ref 0.8–5.3)
LYMPHOCYTES NFR BLD AUTO: 28.8 %
MCH RBC QN AUTO: 28.2 PG (ref 26.5–33)
MCHC RBC AUTO-ENTMCNC: 30.8 G/DL (ref 31.5–36.5)
MCV RBC AUTO: 92 FL (ref 78–100)
MONOCYTES # BLD AUTO: 0.4 10E9/L (ref 0–1.3)
MONOCYTES NFR BLD AUTO: 6.4 %
MUCOUS THREADS #/AREA URNS LPF: PRESENT /LPF
NEUTROPHILS # BLD AUTO: 3.2 10E9/L (ref 1.6–8.3)
NEUTROPHILS NFR BLD AUTO: 59.2 %
NITRATE UR QL: NEGATIVE
NRBC # BLD AUTO: 0 10*3/UL
NRBC BLD AUTO-RTO: 0 /100
PH UR STRIP: 7 PH (ref 5–7)
PLATELET # BLD AUTO: 174 10E9/L (ref 150–450)
POTASSIUM SERPL-SCNC: 3.8 MMOL/L (ref 3.4–5.3)
PROT SERPL-MCNC: 6.6 G/DL (ref 6.8–8.8)
RBC # BLD AUTO: 4.22 10E12/L (ref 3.8–5.2)
RBC #/AREA URNS AUTO: 1 /HPF (ref 0–2)
SODIUM SERPL-SCNC: 142 MMOL/L (ref 133–144)
SOURCE: ABNORMAL
SP GR UR STRIP: 1.01 (ref 1–1.03)
TROPONIN I SERPL-MCNC: <0.015 UG/L (ref 0–0.04)
TSH SERPL DL<=0.005 MIU/L-ACNC: 0.5 MU/L (ref 0.4–4)
UROBILINOGEN UR STRIP-MCNC: 0 MG/DL (ref 0–2)
WBC # BLD AUTO: 5.5 10E9/L (ref 4–11)
WBC #/AREA URNS AUTO: 1 /HPF (ref 0–5)

## 2018-10-07 PROCEDURE — 80053 COMPREHEN METABOLIC PANEL: CPT | Performed by: EMERGENCY MEDICINE

## 2018-10-07 PROCEDURE — 25000128 H RX IP 250 OP 636: Performed by: EMERGENCY MEDICINE

## 2018-10-07 PROCEDURE — A9270 NON-COVERED ITEM OR SERVICE: HCPCS | Mod: GY | Performed by: EMERGENCY MEDICINE

## 2018-10-07 PROCEDURE — 70450 CT HEAD/BRAIN W/O DYE: CPT

## 2018-10-07 PROCEDURE — 96374 THER/PROPH/DIAG INJ IV PUSH: CPT | Mod: 59

## 2018-10-07 PROCEDURE — 25000132 ZZH RX MED GY IP 250 OP 250 PS 637: Mod: GY | Performed by: EMERGENCY MEDICINE

## 2018-10-07 PROCEDURE — 81001 URINALYSIS AUTO W/SCOPE: CPT | Performed by: EMERGENCY MEDICINE

## 2018-10-07 PROCEDURE — 99285 EMERGENCY DEPT VISIT HI MDM: CPT | Mod: 25

## 2018-10-07 PROCEDURE — 70498 CT ANGIOGRAPHY NECK: CPT

## 2018-10-07 PROCEDURE — 84484 ASSAY OF TROPONIN QUANT: CPT | Performed by: EMERGENCY MEDICINE

## 2018-10-07 PROCEDURE — 85025 COMPLETE CBC W/AUTO DIFF WBC: CPT | Performed by: EMERGENCY MEDICINE

## 2018-10-07 PROCEDURE — 96361 HYDRATE IV INFUSION ADD-ON: CPT

## 2018-10-07 PROCEDURE — 84443 ASSAY THYROID STIM HORMONE: CPT | Performed by: EMERGENCY MEDICINE

## 2018-10-07 PROCEDURE — 96375 TX/PRO/DX INJ NEW DRUG ADDON: CPT

## 2018-10-07 RX ORDER — ONDANSETRON 4 MG/1
4 TABLET, ORALLY DISINTEGRATING ORAL EVERY 8 HOURS PRN
Qty: 15 TABLET | Refills: 0 | Status: SHIPPED | OUTPATIENT
Start: 2018-10-07 | End: 2018-10-12

## 2018-10-07 RX ORDER — IOPAMIDOL 755 MG/ML
70 INJECTION, SOLUTION INTRAVASCULAR ONCE
Status: COMPLETED | OUTPATIENT
Start: 2018-10-07 | End: 2018-10-07

## 2018-10-07 RX ORDER — METHYLPREDNISOLONE SODIUM SUCCINATE 125 MG/2ML
125 INJECTION, POWDER, LYOPHILIZED, FOR SOLUTION INTRAMUSCULAR; INTRAVENOUS ONCE
Status: COMPLETED | OUTPATIENT
Start: 2018-10-07 | End: 2018-10-07

## 2018-10-07 RX ORDER — ACETAMINOPHEN 500 MG
1000 TABLET ORAL EVERY 4 HOURS PRN
Status: DISCONTINUED | OUTPATIENT
Start: 2018-10-07 | End: 2018-10-07 | Stop reason: HOSPADM

## 2018-10-07 RX ORDER — MECLIZINE HYDROCHLORIDE 25 MG/1
25 TABLET ORAL ONCE
Status: COMPLETED | OUTPATIENT
Start: 2018-10-07 | End: 2018-10-07

## 2018-10-07 RX ORDER — AZITHROMYCIN 500 MG/1
500 TABLET, FILM COATED ORAL DAILY
Qty: 10 TABLET | Refills: 0 | Status: SHIPPED | OUTPATIENT
Start: 2018-10-07 | End: 2018-10-17

## 2018-10-07 RX ORDER — DIPHENHYDRAMINE HYDROCHLORIDE 50 MG/ML
25 INJECTION INTRAMUSCULAR; INTRAVENOUS ONCE
Status: COMPLETED | OUTPATIENT
Start: 2018-10-07 | End: 2018-10-07

## 2018-10-07 RX ORDER — MECLIZINE HCL 12.5 MG 12.5 MG/1
12.5 TABLET ORAL 4 TIMES DAILY PRN
Qty: 30 TABLET | Refills: 0 | Status: SHIPPED | OUTPATIENT
Start: 2018-10-07

## 2018-10-07 RX ORDER — ONDANSETRON 2 MG/ML
4 INJECTION INTRAMUSCULAR; INTRAVENOUS ONCE
Status: COMPLETED | OUTPATIENT
Start: 2018-10-07 | End: 2018-10-07

## 2018-10-07 RX ADMIN — DIPHENHYDRAMINE HYDROCHLORIDE 25 MG: 50 INJECTION, SOLUTION INTRAMUSCULAR; INTRAVENOUS at 12:38

## 2018-10-07 RX ADMIN — ONDANSETRON 4 MG: 2 INJECTION INTRAMUSCULAR; INTRAVENOUS at 12:31

## 2018-10-07 RX ADMIN — IOPAMIDOL 70 ML: 755 INJECTION, SOLUTION INTRAVENOUS at 15:46

## 2018-10-07 RX ADMIN — METHYLPREDNISOLONE SODIUM SUCCINATE 125 MG: 125 INJECTION, POWDER, FOR SOLUTION INTRAMUSCULAR; INTRAVENOUS at 12:40

## 2018-10-07 RX ADMIN — MECLIZINE HYDROCHLORIDE 25 MG: 25 TABLET ORAL at 12:32

## 2018-10-07 RX ADMIN — SODIUM CHLORIDE 1000 ML: 9 INJECTION, SOLUTION INTRAVENOUS at 12:31

## 2018-10-07 RX ADMIN — SODIUM CHLORIDE 80 ML: 9 INJECTION, SOLUTION INTRAVENOUS at 15:46

## 2018-10-07 RX ADMIN — ACETAMINOPHEN 1000 MG: 500 TABLET, FILM COATED ORAL at 16:21

## 2018-10-07 ASSESSMENT — ENCOUNTER SYMPTOMS
FACIAL ASYMMETRY: 0
FREQUENCY: 1
DIZZINESS: 1
DIAPHORESIS: 1
FEVER: 1
NAUSEA: 1
HEADACHES: 1
NUMBNESS: 0
VOMITING: 1

## 2018-10-07 NOTE — ED PROVIDER NOTES
"  History     Chief Complaint:  Dizziness     HPI   Moni Liriano is a 58 year old female with history of vertigo who presents to the emergency department today for evaluation of dizziness, nausea, and headache.  Patient also feels like her right ear has \"fullness\" with loss of hearing.   Patient has a history of temporary sensorineural hearing loss on the right side in August at which time she undertaken multiple imagings.  Patient was diagnosed with temporary hearing loss via [facility] did suggest neurologist follow up concerning findings on her right frontal lobe (MRI imaging).  She was given a tapering 7 day course of 60 mg of Prednisone for potential ear infection (plus antibiotics, which she completed). Two days ago, patient had the return of muffled sounds from the right ear.  In addition, her vertigo and dizziness has returned \" like the room is spinning.\"  Multiple episodes of nausea and vomiting yesterday.  She took meclizine and zofran, the latter giving her a headache.  Patient attributed this to her history of vertigo and ingested meclizine as well as Zofran of which  brought on the onset of a severe headache. Patient notes she then took Tylenol with relief and adds sitting up exacerbates her nausea. Patient highlights she was not able to ambulate without assistance yesterday but claims symptoms today are better currently in the ED compared to yesterday. In addition, patient claims 10+ episodes of emesis predominately dry heaving yesterday as she was sick to her stomach and was not able to properly eat or drink as well as experiencing frequency of urination, fever, and diaphoresis. Daughter notes patient was very pale yesterday evening. Patient denies any emesis today, dark-colored urine, facial asymmetry, coughs, difficulty ambulating today, or numbness in her fingers. Of note, patient has been able to schedule a consult with Neurologist Dr Tejeda.  No weakness of the hands or legs.  No speech " changes or difficulty swallowing.  Most recent MRI as per below.  Contrast dye is listed as an allergy but patient states she has had CT scans with contrast and no difficulty.    MR Brain IAC W/WO IV Cont7/31/2018  [facility]  Result Impression   IMPRESSION:    1. No evidence for acute infarct.  2. The cerebellopontine angle cisterns and internal auditory canals are normal in appearance.  3. Several punctate T2 and FLAIR signal hyperintensities within the frontal lobe white matter. Differential considerations would include sequela of prior trauma, prior infection/inflammation, chronic microvascular ischemic changes, or sequela of migraine headaches.  4. No abnormal intra-axial enhancement.    Result Narrative   INDICATION: Sudden Sensorineural Hearing Loss, right     TECHNIQUE: MRI of the head with and without contrast using IAC protocol, 10 mL GADOBUTROL 1 MMOL/ML IV SOLN.    COMPARISON:  None.    FINDINGS:      Brain: No evidence of restricted diffusion. Several punctate T2 and FLAIR signal hyperintensities within the frontal lobe white matter. The ventricular system, sulci, and cisterns are normal caliber and configuration. Normal flow voids within the major intracranial vessels. Normal enhancement. The calvarium, visualized paranasal sinuses, and visualized upper cervical spine are unremarkable.     IAC: The vestibular apparatus, cochlea, internal auditory canals and mastoid air cells appear unremarkable. No evidence of skull base mass or labyrinthitis.         Allergies:  Shellfish allergy  Penicillins     Medications:    Clonazepam  Cyclobenzaprine   Cymbalta  Neurontin  Atarax   Fexofenadine   Senokot      Past Medical History:    Anemia  Anxiety  Asthma  Arthritis   Autoimmune disorder  Bipolar 1 disorder  Blood transfusion  Borderline diabetes  Clotting disorder  COPD  Depression  Glucose intolerance   Hypercholesterolemia  Insomnia  Leiomyoma of uterus     Past Surgical History:   "  Appendectomy  Arthrodesis foot  Left partial medial knee arthroplasty  Cholecystectomy  C section  [place] teeth extraction  Gastric bypass  Tubal ligation  Bilateral shoulder surgery    Family History:    History reviewed. No pertinent family history.     Social History:  The patient was accompanied to the ED by daughter.  Smoking Status: Former Smoker   Packs/day: 0.50   Years: 20   Types: Cigarettes   Quit date: 1/1/2010  Smokeless Tobacco: Never Used  Alcohol Use: Negative  Marital Status:      Review of Systems   Constitutional: Positive for diaphoresis and fever.   Gastrointestinal: Positive for nausea and vomiting.   Genitourinary: Positive for frequency.   Neurological: Positive for dizziness and headaches. Negative for facial asymmetry and numbness.   All other systems reviewed and are negative.    Pt c/o severe headache, nausea and dizziness. Has had MRI's done- for some hearing loss in August 2018. Pt denies suicidal thoughts but states she lost all of her support, went on disability 2 years ago and now is unable to see her therapist. 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    Physical Exam     Patient Vitals for the past 24 hrs:   BP Temp Temp src Pulse Heart Rate Resp SpO2 Height Weight   10/07/18 1750 152/82 - - 70 70 - - - -   10/07/18 1645 153/80 - - - - - 95 % - -   10/07/18 1630 155/84 - - - - - 94 % - -   10/07/18 1615 141/76 - - - - - 95 % - -   10/07/18 1515 122/70 - - - - - 93 % - -   10/07/18 1445 124/69 - - - - - 96 % - -   10/07/18 1430 137/72 - - - - - 95 % - -   10/07/18 1415 153/82 - - - - - 95 % - -   10/07/18 1400 149/80 - - - - - 96 % - -   10/07/18 1330 137/78 - - - - - 95 % - -   10/07/18 1315 136/75 - - - - - 94 % - -   10/07/18 1300 139/83 - - - - - 94 % - -   10/07/18 1245 144/85 - - - - - 97 % - -   10/07/18 1230 - - - - - - 94 % - -   10/07/18 1215 120/80 - - - - - 94 % - - " "  10/07/18 1146 129/85 97.2  F (36.2  C) Temporal 73 - 18 97 % 1.6 m (5' 3\") 93 kg (205 lb)     Physical Exam  GEN: patient appears tired  HEAD: atraumatic, normocephalic  EYES: pupils reactive (3plus to 2plus), extraocular muscles intact, conjunctivae normal  ENT: TMs flat and white bilaterally, oropharynx normal with no erythema or exudate, mucus membranes moist, floor of mouth is soft, midface stable, nose mucosa pink with no exudate bilaterally, no redness of the pinna or mastoids on the right or leg side  NECK: no cervical LAD, no meningeal signs, trachea midline, no carotid bruits , no masses or fullness. No thyromegaly, no trapezius tenderness  RESPIRATORY: no tachypnea, breath sounds clear to auscultation (no rales, wheezes, rhonchi), chest wall nontender, normal phonation  CVS: normal S1/S2, no murmurs/rubs/gallops  ABDOMEN: soft, nontender, no masses or organomegaly, no rebound, decreased bowel sounds  BACK: no costovertebral angle tenderness  EXTREMITIES: intact pulses x 4, full range of motion at joints, no edema  MUSCULOSKELETAL: no deformities  SKIN: warm and dry  NEURO: GCS 15, cranial nerves intact.  Motor- moves all 4 extremities with 5/5 strength, DF and PF 5/5.   5/5.  Moves all 4 extremities equally.  Sensation- intact all dermatones to pinprick and light touch.  Reflexes- DTRs 2plus at the knee.  Coordination- romberg negative, normal tandem gait, no ataxia.  Overall symmetrical exam.  Negative beaulieu pike maneuver.  HEME: no bruising or petechiae/contusions  LYMPH: no lymphadenopathy    Emergency Department Course     Imaging:  Radiology findings were communicated with the patient who voiced understanding of the findings.          CT Head w/o Contrast  Normal CT scan of the head.  Reading per radiology  CT SCAN OF THE HEAD WITHOUT CONTRAST   10/7/2018 4:14 PM     HISTORY: Right posterior ear and head pain. Dizziness.     TECHNIQUE: Axial images of the head and coronal reformations without  IV " contrast material.  Radiation dose for this scan was reduced using  automated exposure control, adjustment of the mA and/or kV according  to patient size, or iterative reconstruction technique.    COMPARISON: None.    FINDINGS: The ventricles are normal in size, shape and configuration.  The brain parenchyma and subarachnoid spaces are normal. There is no  evidence of intracranial hemorrhage, mass, acute infarct or anomaly.     The visualized portions of the sinuses and mastoids appear normal.  There is no evidence of trauma.    CTA Head Neck with Contrast  CTA HEAD NECK WITH CONTRAST 10/7/2018 4:01 PM     HISTORY: Dizziness with neck pain.    TECHNIQUE: Axial images were obtained through the head and neck  without and with intravenous contrast. 70 mL of Isovue-370 was given.  Multiplanar reconstructions were performed. 3-D reconstructions off a  remote workstation for CT angiography were also acquired. Carotid  stenoses were evaluated by comparing the caliber of the proximal  internal carotid artery to the caliber of the distal internal carotid  artery. Radiation dose for this scan was reduced using automated  exposure control, adjustment of the mA and/or kV according to patient  size, or iterative reconstruction technique.    Brachiocephalic vessels: Normal.    Right carotid system: Normal.    Left carotid system: Normal.    Right vertebral artery: Normal.    Left vertebral artery: Normal.    [place]: Normal.    Laboratory:  Laboratory findings were communicated with the patient who voiced understanding of the findings.    UA: Mucous Present (A) o/w WNL   CBC: WBC 5.5, HGB 11.9,   CMP: Chloride 110 (H), Protein 6.6 (L) o/w WNL (Creatinine 0.73)  Troponin (Collected 1230): <0.015  TSH with free T4 reflex: 0.50    Interventions:  1231 Zofran 4 mg IV  1232 Antivert 25 mg PO   1238 Benadryl 25 mg IV  1240 Solu-medrol 125 mg IV   1621 Tylenol 1000 mg PO   [place]  Cardiac/Sp02 monitoring  1000cc NS  "bolus IV    Emergency Department Course:    1155 Nursing notes and vitals reviewed.    1204 I performed an exam of the patient as documented above.     1230 IV was inserted and blood was drawn for laboratory testing, results above.    1350 The patient provided a urine sample here in the emergency department. This was sent for laboratory testing, findings above.    1521 The patient was sent for a CT while in the emergency department, results above.      I personally reviewed the imaging and laboratory results with the patient and answered all related questions prior to discharge.    4:35 PM recheck     in the room  /82  Pulse 70  Temp 97.2  F (36.2  C) (Temporal)  Resp 18  Ht 1.6 m (5' 3\")  Wt 93 kg (205 lb)  LMP 03/01/2012  SpO2 95%  BMI 36.31 kg/m2    Discussed results with patient.  Gave patient copies of all results (applicable labs, CT scans and/or ultrasounds).  Answered questions.  Asked patient to followup with PCP.  Circled any abnormal lab values and asked patient to followup with PCP.  Impression & Plan      Medical Decision Making:  Moni Liriano is a pleasant 58 year old female who has vertigo and dizziness and feels as though the right side of her neck hurts and has some fullness in her right ear. She has an extensive ENT history. An IV was placed and labs were sent. Patient's neurological exam was normal. Her urine does not show any evidence of infection, her CBC is completely normal, her comprehensive metabolic panel was normal, troponin does not show any ischemia and TSH does not show any hyper or hypo thyroidism. The CT of the head non contrast is normal and CT angio is being read out by radiology as negative with no sign of dissection or blockage. Patient Moni Liriano has seen Peter Delcid and has a history of asymmetric hearing loss the most recent one she was seen at 8/14/2018. I do want her to go back to her hearing specialist. She describes also a headache and this could " be nuanced migraines, I do want her to follow up. She did not want any pain medicines here at the ER and there was no obvious TM erythema to suggest that she needs meningitis, there was no evidence of mastoid inflammation, she is given a PO challenge and will follow up.     Labs and CT came back as normal (both angiogram and CT noncontrast)- no evidence for mass or dissection.  The patient was requesting a script for prednisone because that is what her ENT gave her previously for inflammation. At this point, I don't think that is appropriate, as we do not know the diagnosis of her symptoms.  I think she needs to get a MRI and see her ENT ASAP.  She feels as though she has sinus congestion even though nothing was seen on CT, and I don't mind giving her course of Zithromax of which she has add for in addition to meclizine and Zofran. She's given copies of all her studies, her neurological exam is normal, and she will follow up.     Diagnosis:    ICD-10-CM   1. Dizziness R42   2. Nonintractable headache, unspecified chronicity pattern, unspecified headache type R51   3. Rule out sinusitis  4. Neck pain    Disposition:   The patient is discharged to home.    Discharge Medications:  Discharge Medication List as of 10/7/2018  5:39 PM      START taking these medications    Details   azithromycin (ZITHROMAX) 500 MG tablet Take 1 tablet (500 mg) by mouth daily for 10 days, Disp-10 tablet, R-0, Local Print      !! meclizine (ANTIVERT) 12.5 MG tablet Take 1 tablet (12.5 mg) by mouth 4 times daily as needed for dizziness, Disp-30 tablet, R-0, Local Print      ondansetron (ZOFRAN ODT) 4 MG ODT tab Take 1 tablet (4 mg) by mouth every 8 hours as needed for nausea, Disp-15 tablet, R-0, Local Print       !! - Potential duplicate medications found. Please discuss with provider.        Scribe Disclosure:  JAYLEN, Johnnie Martines, am serving as a scribe at 11:56 AM on 10/7/2018 to document services personally performed by Chandrika Bernstein,  MD based on my observations and the provider's statements to me.    [facility] EMERGENCY DEPARTMENT       Chandrika Bernstein MD  10/08/18 0940

## 2018-10-07 NOTE — ED AVS SNAPSHOT
[facility] Emergency Department    [address] [place] [address]    Phone:  [phone]    Fax:  [phone]                                       Moni Liriano   MRN: [id number]    Department:  [facility] Emergency Department   Date of Visit:  10/7/2018           Patient Information     Date Of Birth          1960        Your diagnoses for this visit were:     Dizziness     Nonintractable headache, unspecified chronicity pattern, unspecified headache type        You were seen by Chandrika Bernstein MD.      Follow-up Information     Follow up with Gianna Smith    Why:  ENT at [facility]    Contact information:    [facility]  [address]   [place] [address]  [phone]        Discharge References/Attachments     HEADACHE, UNSPECIFIED (ENGLISH)    DIZZINESS (VERTIGO) WITH MEDICINES, MANAGING (ENGLISH)    CHEST PAIN, UNCERTAIN CAUSE (ENGLISH)      24 Hour Appointment Hotline       To make an appointment at any [facility] clinic, call [phone] ([phone]). If you don't have a family doctor or clinic, we will help you find one. [facility] clinics are conveniently located to serve the needs of you and your family.             Review of your medicines      START taking        Dose / Directions Last dose taken    azithromycin 500 MG tablet   Commonly known as:  ZITHROMAX   Dose:  500 mg   Quantity:  10 tablet        Take 1 tablet (500 mg) by mouth daily for 10 days   Refills:  0        ondansetron 4 MG ODT tab   Commonly known as:  ZOFRAN ODT   Dose:  4 mg   Quantity:  15 tablet        Take 1 tablet (4 mg) by mouth every 8 hours as needed for nausea   Refills:  0          Our records show that you are taking the medicines listed below. If these are incorrect, please call your family doctor or clinic.        Dose / Directions Last dose taken    acetaminophen 325 MG tablet   Commonly known as:  TYLENOL   Dose:  650 mg   Quantity:  100 tablet         Take 2 tablets (650 mg) by mouth every 4 hours as needed for other (mild pain)   Refills:  0        albuterol 108 (90 Base) MCG/ACT inhaler   Commonly known as:  PROAIR HFA/PROVENTIL HFA/VENTOLIN HFA   Dose:  2 puff        Inhale 2 puffs into the lungs every 4 hours as needed for shortness of breath / dyspnea or wheezing   Refills:  0        ARIPIPRAZOLE PO   Dose:  2 mg        Take 2 mg by mouth daily   Refills:  0        ascorbic acid 250 MG Chew chewable tablet   Commonly known as:  vitamin C   Dose:  250 mg        Take 250 mg by mouth daily   Refills:  0        aspirin 325 MG EC tablet   Dose:  325 mg   Quantity:  42 tablet        Take 1 tablet (325 mg) by mouth daily   Refills:  0        BIOTIN PO   Dose:  2.5 mg        Take 2.5 mg by mouth   Refills:  0        CLINDAMYCIN HCL PO   Dose:  600 mg        Take 600 mg by mouth once (As needed.)   Refills:  0        CLONAZEPAM PO   Dose:  1.5 mg        Take 1.5 mg by mouth At Bedtime (1-2 tablets nightly)   Refills:  0        COLACE PO   Dose:  100 mg        Take 100 mg by mouth daily   Refills:  0        cyanocobalamin 1000 MCG/ML injection   Commonly known as:  VITAMIN B12   Dose:  1 mL        Inject 1 mL into the muscle every 14 days   Refills:  0        CYCLOBENZAPRINE HCL PO   Dose:  10 mg        Take 10 mg by mouth At Bedtime   Refills:  0        CYMBALTA PO   Dose:  120 mg        Take 120 mg by mouth every morning (takes 2 X 60 mg = 120 mg dose)   Refills:  0        estradiol 0.1 MG/GM cream   Commonly known as:  ESTRACE   Dose:  1 g        Place 1 g vaginally every 21 days (May repeat course every 3-4 months)   Refills:  0        FEXOFENADINE HCL PO   Dose:  180 mg        Take 180 mg by mouth daily   Refills:  0        fluticasone 50 MCG/ACT spray   Commonly known as:  FLONASE   Dose:  2 spray        Spray 2 sprays into both nostrils daily   Refills:  0        fluticasone-salmeterol 250-50 MCG/DOSE diskus inhaler   Commonly known as:  ADVAIR   Dose:  1 puff         Inhale 1 puff into the lungs every 12 hours (rinse and gargle after use)   Refills:  0        FOLIC ACID PO   Dose:  3 mg        Take 3 mg by mouth daily (takes 3 X 1 mg = 3 mg dose)   Refills:  0        gabapentin 300 MG capsule   Commonly known as:  NEURONTIN   Dose:  300 mg   Quantity:  9 capsule        Take 1 capsule (300 mg) by mouth 3 times daily   Refills:  1        GLYCOLAX PO   Dose:  17 g        Take 17 g by mouth 2 times daily as needed   Refills:  0        guaiFENesin 600 MG 12 hr tablet   Commonly known as:  MUCINEX   Dose:  1200 mg        Take 1,200 mg by mouth 2 times daily (takes 2 X 600 mg = 1,200 mg dose)   Refills:  0        hydrOXYzine 25 MG tablet   Commonly known as:  ATARAX   Dose:  25 mg   Quantity:  30 tablet        Take 1 tablet (25 mg) by mouth every 6 hours as needed for itching (nausea, muscle spasms and adjuvant pain.)   Refills:  0        LAMICTAL PO   Dose:  300 mg        Take 300 mg by mouth At Bedtime (takes 2 X 150 mg = 300 mg dose)   Refills:  0        lidocaine visc 2% 2.5mL/5mL & maalox/mylanta w/ simeth 2.5mL/5mL & diphenhydrAMINE 5mg/5mL Susp suspension   Commonly known as:  [facility] Mouthwash [facility]   Dose:  10 mL        Swish and swallow 10 mLs in mouth every 4 hours as needed for mouth sores   Refills:  0        Multi-vitamin Tabs tablet   Dose:  1 tablet        Take 1 tablet by mouth daily   Refills:  0        NAPROXEN PO   Dose:  500 mg        Take 500 mg by mouth 2 times daily as needed   Refills:  0        NONFORMULARY        Aluminum and magnesium, diphenhydramine,lidacine,sucrafate suspension   Refills:  0        OMEPRAZOLE PO   Dose:  20 mg        Take 20 mg by mouth daily (before a meal)   Refills:  0        * OXYCONTIN PO   Dose:  10 mg        Take 10 mg by mouth every 12 hours   Refills:  0        * oxyCODONE IR 5 MG tablet   Commonly known as:  ROXICODONE   Dose:  5-15 mg   Quantity:  60 tablet        Take 1-3 tablets (5-15 mg) by mouth every 3 hours as  needed for pain or other (Moderate to Severe)   Refills:  0        senna-docusate 8.6-50 MG per tablet   Commonly known as:  SENOKOT-S;PERICOLACE   Dose:  1-2 tablet   Quantity:  30 tablet        Take 1-2 tablets by mouth 2 times daily Take while on oral narcotics to prevent or treat constipation.   Refills:  0        TRAZODONE HCL PO   Dose:  300 mg        Take 300 mg by mouth At Bedtime (takes 2 X 150 mg = 300 mg dose)   Refills:  0        VITAMIN D (CHOLECALCIFEROL) PO   Dose:  4000 Units        Take 4,000 Units by mouth   Refills:  0        * Notice:  This list has 2 medication(s) that are the same as other medications prescribed for you. Read the directions carefully, and ask your doctor or other care provider to review them with you.      ASK your doctor about these medications        Dose / Directions Last dose taken    * meclizine 12.5 MG tablet   Commonly known as:  ANTIVERT   Dose:  12.5 mg   What changed:  Another medication with the same name was added. Make sure you understand how and when to take each.   Quantity:  30 tablet   Ask about: Which instructions should I use?        Take 1 tablet (12.5 mg) by mouth 4 times daily as needed for dizziness   Refills:  0        * meclizine 12.5 MG tablet   Commonly known as:  ANTIVERT   Dose:  12.5 mg   What changed:  You were already taking a medication with the same name, and this prescription was added. Make sure you understand how and when to take each.   Quantity:  30 tablet   Ask about: Which instructions should I use?        Take 1 tablet (12.5 mg) by mouth 4 times daily as needed for dizziness   Refills:  0        * Notice:  This list has 2 medication(s) that are the same as other medications prescribed for you. Read the directions carefully, and ask your doctor or other care provider to review them with you.            Prescriptions were sent or printed at these locations (3 Prescriptions)                   Other Prescriptions                Printed at  Department/Unit printer (3 of 3)         azithromycin (ZITHROMAX) 500 MG tablet               meclizine (ANTIVERT) 12.5 MG tablet               ondansetron (ZOFRAN ODT) 4 MG ODT tab                Procedures and tests performed during your visit     CBC with platelets differential    CT Head w/o Contrast    CTA Head Neck with Contrast    Comprehensive metabolic panel    Peripheral IV catheter    TSH with free T4 reflex    Troponin I    UA with Microscopic      Orders Needing Specimen Collection     None      Pending Results     Date and Time Order Name Status Description    10/7/2018 1215 CTA Head Neck with Contrast Preliminary     10/7/2018 1215 CT Head w/o Contrast Preliminary             Pending Culture Results     No orders found from 10/5/2018 to 10/8/2018.            Pending Results Instructions     If you had any lab results that were not finalized at the time of your Discharge, you can call the ED Lab Result RN at [phone]. You will be contacted by this team for any positive Lab results or changes in treatment. The nurses are available 7 days a week from 10A to 6:30P.  You can leave a message 24 hours per day and they will return your call.        Test Results From Your Hospital Stay        10/7/2018 12:49 PM      Component Results     Component Value Ref Range & Units Status    WBC 5.5 4.0 - 11.0 10e9/L Final    RBC Count 4.22 3.8 - 5.2 10e12/L Final    Hemoglobin 11.9 11.7 - 15.7 g/dL Final    Hematocrit 38.6 35.0 - 47.0 % Final    MCV 92 78 - 100 fl Final    MCH 28.2 26.5 - 33.0 pg Final    MCHC 30.8 (L) 31.5 - 36.5 g/dL Final    RDW 13.7 10.0 - 15.0 % Final    Platelet Count 174 150 - 450 10e9/L Final    Diff Method Automated Method  Final    % Neutrophils 59.2 % Final    % Lymphocytes 28.8 % Final    % Monocytes 6.4 % Final    % Eosinophils 4.9 % Final    % Basophils 0.5 % Final    % Immature Granulocytes 0.2 % Final    Nucleated RBCs 0 0 /100 Final    Absolute Neutrophil 3.2 1.6 - 8.3 10e9/L Final     Absolute Lymphocytes 1.6 0.8 - 5.3 10e9/L Final    Absolute Monocytes 0.4 0.0 - 1.3 10e9/L Final    Absolute Eosinophils 0.3 0.0 - 0.7 10e9/L Final    Absolute Basophils 0.0 0.0 - 0.2 10e9/L Final    Abs Immature Granulocytes 0.0 0 - 0.4 10e9/L Final    Absolute Nucleated RBC 0.0  Final         10/7/2018  1:13 PM      Component Results     Component Value Ref Range & Units Status    Sodium 142 133 - 144 mmol/L Final    Potassium 3.8 3.4 - 5.3 mmol/L Final    Chloride 110 (H) 94 - 109 mmol/L Final    Carbon Dioxide 24 20 - 32 mmol/L Final    Anion Gap 8 3 - 14 mmol/L Final    Glucose 91 70 - 99 mg/dL Final    Urea Nitrogen 17 7 - 30 mg/dL Final    Creatinine 0.73 0.52 - 1.04 mg/dL Final    GFR Estimate 82 >60 mL/min/1.7m2 Final    Non  GFR Calc    GFR Estimate If Black >90 >60 mL/min/1.7m2 Final    African American GFR Calc    Calcium 8.6 8.5 - 10.1 mg/dL Final    Bilirubin Total 0.3 0.2 - 1.3 mg/dL Final    Albumin 3.6 3.4 - 5.0 g/dL Final    Protein Total 6.6 (L) 6.8 - 8.8 g/dL Final    Alkaline Phosphatase 101 40 - 150 U/L Final    ALT 15 0 - 50 U/L Final    AST 15 0 - 45 U/L Final         10/7/2018  1:13 PM      Component Results     Component Value Ref Range & Units Status    Troponin I ES <0.015 0.000 - 0.045 ug/L Final    The 99th percentile for upper reference range is 0.045 ug/L.  Troponin values   in the range of 0.045 - 0.120 ug/L may be associated with risks of adverse   clinical events.           10/7/2018  2:37 PM      Component Results     Component Value Ref Range & Units Status    Color Urine Yellow  Final    Appearance Urine Clear  Final    Glucose Urine Negative NEG^Negative mg/dL Final    Bilirubin Urine Negative NEG^Negative Final    Ketones Urine Negative NEG^Negative mg/dL Final    Specific Gravity Urine 1.008 1.003 - 1.035 Final    Blood Urine Negative NEG^Negative Final    pH Urine 7.0 5.0 - 7.0 pH Final    Protein Albumin Urine Negative NEG^Negative mg/dL Final     Urobilinogen mg/dL 0.0 0.0 - 2.0 mg/dL Final    Nitrite Urine Negative NEG^Negative Final    Leukocyte Esterase Urine Negative NEG^Negative Final    Source Midstream Urine  Final    WBC Urine 1 0 - 5 /HPF Final    RBC Urine 1 0 - 2 /HPF Final    Mucous Urine Present (A) NEG^Negative /LPF Final         10/7/2018  1:18 PM      Component Results     Component Value Ref Range & Units Status    TSH 0.50 0.40 - 4.00 mU/L Final                     10/7/2018  4:18 PM      Narrative     CT SCAN OF THE HEAD WITHOUT CONTRAST   10/7/2018 4:14 PM     HISTORY: Right posterior ear and head pain. Dizziness.     TECHNIQUE: Axial images of the head and coronal reformations without  IV contrast material.  Radiation dose for this scan was reduced using  automated exposure control, adjustment of the mA and/or kV according  to patient size, or iterative reconstruction technique.    COMPARISON: None.    FINDINGS: The ventricles are normal in size, shape and configuration.  The brain parenchyma and subarachnoid spaces are normal. There is no  evidence of intracranial hemorrhage, mass, acute infarct or anomaly.     The visualized portions of the sinuses and mastoids appear normal.  There is no evidence of trauma.        Impression     IMPRESSION: Normal CT scan of the head.         10/7/2018  5:07 PM      Narrative     CTA HEAD NECK WITH CONTRAST 10/7/2018 4:01 PM     HISTORY: Dizziness with neck pain.    TECHNIQUE: Axial images were obtained through the head and neck  without and with intravenous contrast. 70 mL of Isovue-370 was given.  Multiplanar reconstructions were performed. 3-D reconstructions off a  remote workstation for CT angiography were also acquired. Carotid  stenoses were evaluated by comparing the caliber of the proximal  internal carotid artery to the caliber of the distal internal carotid  artery. Radiation dose for this scan was reduced using automated  exposure control, adjustment of the mA and/or kV according to  patient  size, or iterative reconstruction technique.    Brachiocephalic vessels: Normal.    Right carotid system: Normal.    Left carotid system: Normal.    Right vertebral artery: Normal.    Left vertebral artery: Normal.    [place]: Normal.        Impression     IMPRESSION: Negative CT angiography of the head and neck.                Clinical Quality Measure: Blood Pressure Screening     Your blood pressure was checked while you were in the emergency department today. The last reading we obtained was  BP: 153/82 . Please read the guidelines below about what these numbers mean and what you should do about them.  If your systolic blood pressure (the top number) is less than 120 and your diastolic blood pressure (the bottom number) is less than 80, then your blood pressure is normal. There is nothing more that you need to do about it.  If your systolic blood pressure (the top number) is 120-139 or your diastolic blood pressure (the bottom number) is 80-89, your blood pressure may be higher than it should be. You should have your blood pressure rechecked within a year by a primary care provider.  If your systolic blood pressure (the top number) is 140 or greater or your diastolic blood pressure (the bottom number) is 90 or greater, you may have high blood pressure. High blood pressure is treatable, but if left untreated over time it can put you at risk for heart attack, stroke, or kidney failure. You should have your blood pressure rechecked by a primary care provider within the next 4 weeks.  If your provider in the emergency department today gave you specific instructions to follow-up with your doctor or provider even sooner than that, you should follow that instruction and not wait for up to 4 weeks for your follow-up visit.        Thank you for choosing [facility]       Thank you for choosing [facility] for your care. Our goal is always to provide you with excellent care. Hearing back from our patients is  "one way we can continue to improve our services. Please take a few minutes to complete the written survey that you may receive in the mail after you visit with us. Thank you!        [employer] Information     Fan[employer] lets you send messages to your doctor, view your test results, renew your prescriptions, schedule appointments and more. To sign up, go to [email or web address] . Click on \"Log in\" on the left side of the screen, which will take you to the Welcome page. Then click on \"Sign up Now\" on the right side of the page.     You will be asked to enter the access code listed below, as well as some personal information. Please follow the directions to create your username and password.     Your access code is: [email or web address]  Expires: 2019  5:39 PM     Your access code will  in 90 days. If you need help or a new code, please call your [facility] clinic or [phone].        Care EveryWhere ID     This is your Care EveryWhere ID. This could be used by other organizations to access your [facility] medical records  [id number]        Equal Access to Services     HELADIO SALAS : Hadjam Church, andre jin, jerry dominguez, padmini warren . So [facility] [phone].    ATENCIÓN: Si habla español, tiene a sung disposición servicios gratuitos de asistencia lingüística. Meet al [phone].    We comply with applicable federal civil rights laws and [place] laws. We do not discriminate on the basis of race, color, national origin, age, disability, sex, sexual orientation, or gender identity.            After Visit Summary       This is your record. Keep this with you and show to your community pharmacist(s) and doctor(s) at your next visit.                  "

## 2018-10-07 NOTE — ED AVS SNAPSHOT
[facility] Emergency Department    [address]    [facility] [address]    Phone:  [phone]    Fax:  [phone]                                       Moni Liriano   MRN: [id number]    Department:  [facility] Emergency Department   Date of Visit:  10/7/2018           After Visit Summary Signature Page     I have received my discharge instructions, and my questions have been answered. I have discussed any challenges I see with this plan with the nurse or doctor.    ..........................................................................................................................................  Patient/Patient Representative Signature      ..........................................................................................................................................  Patient Representative Print Name and Relationship to Patient    ..................................................               ................................................  Date                                   Time    ..........................................................................................................................................  Reviewed by Signature/Title    ...................................................              ..............................................  Date                                               Time          22EPIC Rev 08/18

## 2018-10-07 NOTE — ED TRIAGE NOTES
"Pt c/o severe headache, nausea and dizziness. Has had MRI's done- for some hearing loss in August 2018. Pt denies suicidal thoughts but states she lost all of her support, went on disability 2 years ago and now is unable to see her therapist. 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  "

## 2018-10-07 NOTE — ED NOTES
Patient notes that she has never had an allergic reaction to CT dye. MD okay with having CT with contrast and patient agrees.

## 2020-01-25 ENCOUNTER — APPOINTMENT (OUTPATIENT)
Dept: CT IMAGING | Facility: CLINIC | Age: 60
End: 2020-01-25
Attending: PHYSICIAN ASSISTANT
Payer: COMMERCIAL

## 2020-01-25 ENCOUNTER — HOSPITAL ENCOUNTER (EMERGENCY)
Facility: CLINIC | Age: 60
Discharge: HOME | End: 2020-01-25
Attending: PHYSICIAN ASSISTANT | Admitting: PHYSICIAN ASSISTANT
Payer: COMMERCIAL

## 2020-01-25 VITALS
BODY MASS INDEX: 31.95 KG/M2 | HEART RATE: 68 BPM | OXYGEN SATURATION: 95 % | WEIGHT: 180.34 LBS | SYSTOLIC BLOOD PRESSURE: 107 MMHG | TEMPERATURE: 97.3 F | DIASTOLIC BLOOD PRESSURE: 66 MMHG | RESPIRATION RATE: 21 BRPM

## 2020-01-25 DIAGNOSIS — E87.6 HYPOKALEMIA: ICD-10-CM

## 2020-01-25 DIAGNOSIS — R07.89 ATYPICAL CHEST PAIN: ICD-10-CM

## 2020-01-25 DIAGNOSIS — F41.9 ANXIETY: ICD-10-CM

## 2020-01-25 LAB
ALBUMIN SERPL-MCNC: 3.9 G/DL (ref 3.4–5)
ALP SERPL-CCNC: 101 U/L (ref 40–150)
ALT SERPL W P-5'-P-CCNC: 21 U/L (ref 0–50)
ANION GAP SERPL CALCULATED.3IONS-SCNC: 7 MMOL/L (ref 3–14)
AST SERPL W P-5'-P-CCNC: 19 U/L (ref 0–45)
BASOPHILS # BLD AUTO: 0 10E9/L (ref 0–0.2)
BASOPHILS NFR BLD AUTO: 0.4 %
BILIRUB SERPL-MCNC: 0.3 MG/DL (ref 0.2–1.3)
BUN SERPL-MCNC: 20 MG/DL (ref 7–30)
CALCIUM SERPL-MCNC: 9.6 MG/DL (ref 8.5–10.1)
CHLORIDE SERPL-SCNC: 105 MMOL/L (ref 94–109)
CO2 SERPL-SCNC: 27 MMOL/L (ref 20–32)
CREAT SERPL-MCNC: 0.71 MG/DL (ref 0.52–1.04)
D DIMER PPP FEU-MCNC: 0.7 UG/ML FEU (ref 0–0.5)
DIFFERENTIAL METHOD BLD: ABNORMAL
EOSINOPHIL # BLD AUTO: 0.3 10E9/L (ref 0–0.7)
EOSINOPHIL NFR BLD AUTO: 3.1 %
ERYTHROCYTE [DISTWIDTH] IN BLOOD BY AUTOMATED COUNT: 13.7 % (ref 10–15)
GFR SERPL CREATININE-BSD FRML MDRD: >90 ML/MIN/{1.73_M2}
GLUCOSE SERPL-MCNC: 92 MG/DL (ref 70–99)
HCT VFR BLD AUTO: 45.4 % (ref 35–47)
HGB BLD-MCNC: 14.7 G/DL (ref 11.7–15.7)
IMM GRANULOCYTES # BLD: 0 10E9/L (ref 0–0.4)
IMM GRANULOCYTES NFR BLD: 0.3 %
LIPASE SERPL-CCNC: 165 U/L (ref 73–393)
LYMPHOCYTES # BLD AUTO: 2.4 10E9/L (ref 0.8–5.3)
LYMPHOCYTES NFR BLD AUTO: 23.1 %
MCH RBC QN AUTO: 27.9 PG (ref 26.5–33)
MCHC RBC AUTO-ENTMCNC: 32.4 G/DL (ref 31.5–36.5)
MCV RBC AUTO: 86 FL (ref 78–100)
MONOCYTES # BLD AUTO: 0.9 10E9/L (ref 0–1.3)
MONOCYTES NFR BLD AUTO: 8.3 %
NEUTROPHILS # BLD AUTO: 6.7 10E9/L (ref 1.6–8.3)
NEUTROPHILS NFR BLD AUTO: 64.8 %
NRBC # BLD AUTO: 0 10*3/UL
NRBC BLD AUTO-RTO: 0 /100
PLATELET # BLD AUTO: 266 10E9/L (ref 150–450)
POTASSIUM SERPL-SCNC: 2.9 MMOL/L (ref 3.4–5.3)
PROT SERPL-MCNC: 7.9 G/DL (ref 6.8–8.8)
RBC # BLD AUTO: 5.26 10E12/L (ref 3.8–5.2)
SODIUM SERPL-SCNC: 139 MMOL/L (ref 133–144)
TROPONIN I BLD-MCNC: 0 UG/L (ref 0–0.08)
TROPONIN I SERPL-MCNC: <0.015 UG/L (ref 0–0.04)
WBC # BLD AUTO: 10.3 10E9/L (ref 4–11)

## 2020-01-25 PROCEDURE — 71275 CT ANGIOGRAPHY CHEST: CPT

## 2020-01-25 PROCEDURE — 85025 COMPLETE CBC W/AUTO DIFF WBC: CPT | Performed by: PHYSICIAN ASSISTANT

## 2020-01-25 PROCEDURE — 96374 THER/PROPH/DIAG INJ IV PUSH: CPT | Mod: 59

## 2020-01-25 PROCEDURE — 93005 ELECTROCARDIOGRAM TRACING: CPT

## 2020-01-25 PROCEDURE — 84484 ASSAY OF TROPONIN QUANT: CPT | Performed by: PHYSICIAN ASSISTANT

## 2020-01-25 PROCEDURE — 84484 ASSAY OF TROPONIN QUANT: CPT

## 2020-01-25 PROCEDURE — 25000132 ZZH RX MED GY IP 250 OP 250 PS 637: Performed by: PHYSICIAN ASSISTANT

## 2020-01-25 PROCEDURE — 99285 EMERGENCY DEPT VISIT HI MDM: CPT | Mod: 25

## 2020-01-25 PROCEDURE — 25000128 H RX IP 250 OP 636: Performed by: PHYSICIAN ASSISTANT

## 2020-01-25 PROCEDURE — 83690 ASSAY OF LIPASE: CPT | Performed by: PHYSICIAN ASSISTANT

## 2020-01-25 PROCEDURE — 85379 FIBRIN DEGRADATION QUANT: CPT | Performed by: PHYSICIAN ASSISTANT

## 2020-01-25 PROCEDURE — 80053 COMPREHEN METABOLIC PANEL: CPT | Performed by: PHYSICIAN ASSISTANT

## 2020-01-25 PROCEDURE — 25000125 ZZHC RX 250: Performed by: PHYSICIAN ASSISTANT

## 2020-01-25 PROCEDURE — 96375 TX/PRO/DX INJ NEW DRUG ADDON: CPT

## 2020-01-25 PROCEDURE — 96376 TX/PRO/DX INJ SAME DRUG ADON: CPT

## 2020-01-25 RX ORDER — LORAZEPAM 2 MG/ML
1 INJECTION INTRAMUSCULAR ONCE
Status: COMPLETED | OUTPATIENT
Start: 2020-01-25 | End: 2020-01-25

## 2020-01-25 RX ORDER — ONDANSETRON 2 MG/ML
4 INJECTION INTRAMUSCULAR; INTRAVENOUS EVERY 30 MIN PRN
Status: DISCONTINUED | OUTPATIENT
Start: 2020-01-25 | End: 2020-01-25 | Stop reason: HOSPADM

## 2020-01-25 RX ORDER — TRAZODONE HYDROCHLORIDE 150 MG/1
TABLET ORAL
COMMUNITY
Start: 2019-12-22

## 2020-01-25 RX ORDER — AMITRIPTYLINE HYDROCHLORIDE 10 MG/1
1 TABLET ORAL
COMMUNITY

## 2020-01-25 RX ORDER — IOPAMIDOL 755 MG/ML
500 INJECTION, SOLUTION INTRAVASCULAR ONCE
Status: COMPLETED | OUTPATIENT
Start: 2020-01-25 | End: 2020-01-25

## 2020-01-25 RX ORDER — DULOXETIN HYDROCHLORIDE 60 MG/1
CAPSULE, DELAYED RELEASE ORAL
COMMUNITY
Start: 2019-12-25

## 2020-01-25 RX ORDER — CLONAZEPAM 1 MG/1
TABLET ORAL
COMMUNITY
Start: 2019-09-19

## 2020-01-25 RX ORDER — HYDROCHLOROTHIAZIDE 25 MG/1
25 TABLET ORAL
COMMUNITY
Start: 2019-12-11 | End: 2020-12-10

## 2020-01-25 RX ORDER — POTASSIUM CHLORIDE 1.5 G/1.58G
40 POWDER, FOR SOLUTION ORAL ONCE
Status: COMPLETED | OUTPATIENT
Start: 2020-01-25 | End: 2020-01-25

## 2020-01-25 RX ORDER — NORTRIPTYLINE HCL 25 MG
CAPSULE ORAL
COMMUNITY
Start: 2019-12-26

## 2020-01-25 RX ORDER — LAMOTRIGINE 150 MG/1
TABLET ORAL
COMMUNITY
Start: 2019-12-22

## 2020-01-25 RX ORDER — TOPIRAMATE 50 MG/1
3 TABLET, FILM COATED ORAL EVERY 12 HOURS
COMMUNITY

## 2020-01-25 RX ADMIN — ONDANSETRON HYDROCHLORIDE 4 MG: 2 INJECTION, SOLUTION INTRAMUSCULAR; INTRAVENOUS at 20:35

## 2020-01-25 RX ADMIN — IOPAMIDOL 63 ML: 755 INJECTION, SOLUTION INTRAVENOUS at 20:18

## 2020-01-25 RX ADMIN — POTASSIUM CHLORIDE 40 MEQ: 1.5 POWDER, FOR SOLUTION ORAL at 19:26

## 2020-01-25 RX ADMIN — ONDANSETRON HYDROCHLORIDE 4 MG: 2 INJECTION, SOLUTION INTRAMUSCULAR; INTRAVENOUS at 19:06

## 2020-01-25 RX ADMIN — LORAZEPAM 1 MG: 2 INJECTION INTRAMUSCULAR; INTRAVENOUS at 19:06

## 2020-01-25 RX ADMIN — SODIUM CHLORIDE 82 ML: 9 INJECTION, SOLUTION INTRAVENOUS at 20:18

## 2020-01-25 ASSESSMENT — ENCOUNTER SYMPTOMS
WEAKNESS: 1
COUGH: 1
ABDOMINAL PAIN: 0

## 2020-01-25 NOTE — ED AVS SNAPSHOT
[facility] Emergency Department  [address]  [facility] [address]  Phone:  [phone]  Fax:  [phone]                                    Moni Liriano   MRN: [id number]    Department:  [facility] Emergency Department   Date of Visit:  1/25/2020           After Visit Summary Signature Page    I have received my discharge instructions, and my questions have been answered. I have discussed any challenges I see with this plan with the nurse or doctor.    ..........................................................................................................................................  Patient/Patient Representative Signature      ..........................................................................................................................................  Patient Representative Print Name and Relationship to Patient    ..................................................               ................................................  Date                                   Time    ..........................................................................................................................................  Reviewed by Signature/Title    ...................................................              ..............................................  Date                                               Time          22EPIC Rev 08/18

## 2020-01-26 LAB — INTERPRETATION ECG - MUSE: NORMAL

## 2020-01-26 NOTE — ED PROVIDER NOTES
"  History     Chief Complaint:  Chest Pain     HPI   Moni Liriano is a 59 year old female with a history of clotting disorder and fibromyalgia who presents with chest pain. The patient developed left sided stabbing chest pain 2 days ago. She states that the chest pain has been getting worse. She has tried taking Pepto bismol because she thought she was having acid reflux but had no improvement of the chest pain. She has associated nausea but no vomiting. She states that her chronic pain in her left shoulder and arm also became worse today. The daughter notes that the patient reported a \"ripping and tearing\" pain in the patient's left shoulder today. In addition, the patient also reports having weakness and a cough for the past 4 days. She denies any abdominal pain. She denies any hormone use. Her last stress test was in 2018 and was normal per patient.     Cardiac/PE/DVT Risk Factors:  History of hypertension - no  History of hyperlipidemia - yes  History of diabetes - yes  History of smoking - yes  Personal history of PE/DVT - no  Family history of PE/DVT - no  Family history of heart complications - yes  Recent travel - no  Recent surgery - no  Other immobilizations - no  Cancer - no     Allergies:  Shellfish Allergy  Penicillins     Medications:    Aripiprazole  Clonazepam   Duloxetine   gabapentin   Atarax   lamictal   Omeprazole   Trazodone     Past Medical History:    Anemia   Anxiety   Arthritis   Autoimmune disorder    Bipolar 1 disorder    Blood transfusion   Borderline diabetes   Clotting disorder   COPD (chronic obstructive pulmonary disease)   Depression   History of blood transfusion   Hypercholesterolemia   Insomnia   Leiomyoma of uterus   Neck pain   Obese   Other chronic pain   Uncomplicated asthma   Diabetes type 2     Past Surgical History:    Appendectomy   Arthrodesis foot  Arthroplasty knee    section   Cholecystectomy   D&C  [place] teeth extraction   Gastric bypass   Shoulder " arthroscopy   Tubal ligation   Endometrial ablation   Tonsillectomy     Family History:    Psychiatric illness  Heart disease    Social History:  The patient was accompanied to the ED by daughter.  Smoking Status: former smoker  Smokeless Tobacco: Never Used  Alcohol Use: No  Drug Use: No  PCP: Gianna Smith     Review of Systems   Respiratory: Positive for cough.    Cardiovascular: Positive for chest pain.   Gastrointestinal: Negative for abdominal pain.   Musculoskeletal:        Left shoulder pain (chronic)  Left arm pain (chronic)   Neurological: Positive for weakness.   All other systems reviewed and are negative.    Physical Exam     Patient Vitals for the past 24 hrs:   BP Temp Temp src Pulse Heart Rate Resp SpO2 Weight   01/25/20 1900 -- -- -- -- 75 9 97 % --   01/25/20 1845 (!) 146/99 -- -- 78 -- 18 98 % --   01/25/20 1823 (!) 136/100 97.3  F (36.3  C) Temporal -- 85 22 99 % 81.8 kg (180 lb 5.4 oz)        Physical Exam  General: Well appearing, well nourished.   Skin: Good turgor, no rash, no unusual bruising or prominent lesions.  HEENT: Head: Normocephalic, atraumatic, no visible masses.   Eyes: Conjunctiva clear.   Throat/pharynx: Mucous membranes moist, no mucosal lesions.   Cardiac: Normal rate and regular rhythm, no murmur or gallop.   Lungs: Clear to auscultation.  Abdomen: Abdomen soft, non-tender. No rebound tenderness of guarding.   Musculoskeletal: Normal gait and station. No calf tenderness or swelling.   Neurologic: Oriented x 3. GCS: 15.  Psychiatric: Intact recent and remote memory, judgment and insight.  Significantly anxious.  Occasionally crying.    Emergency Department Course   ECG:  ECG taken at 1831  Normal sinus rhythm  Left axis deviation   Cannot rule out anterior infarct, age undetermined  Abnormal ECG  Rate 80 bpm. PA interval 138 ms. QRS duration 92 ms. QT/QTc 394/454 ms. P-R-T axes 53 -30 65.     Imaging:  Radiology findings were communicated with the patient who voiced  understanding of the findings.    CT Chest Pulmonary Embolism w Contrast  Final Result  IMPRESSION:  1.  No evidence for pulmonary embolism.  Reading per radiology     Laboratory:  Laboratory findings were communicated with the patient who voiced understanding of the findings.    CBC:  WBC 10.3, HGB 14.7, , o/w WNL     D dimer quantitative: 0.7 (H)    CMP: potassium 2.9 (L), o/w WNL (Creatinine: 0.71)    Lipase: 165    Troponin(1847):  <0.015       Troponin POCT (1845): 0.00     Interventions:  1906 zofran 4 mg IV   1906 Ativan 1 mg IV  1926 Potassium chloride ER 40 mEq oral    2035 zofran 4 mg IV     Emergency Department Course:  Past medical records, nursing notes, and vitals reviewed.    1830 I performed an exam of the patient as documented above.    IV was inserted and blood was drawn for laboratory testing, results above.     The patient was sent for a CT pulmonary embolism while in the emergency department, results above.       2040 Patient rechecked and updated.       Findings and plan explained to the Patient. Patient discharged home with instructions regarding supportive care, medications, and reasons to return. The importance of close follow-up was reviewed.     Impression & Plan     Medical Decision Making:  Moni Liriano is a 59 year old female who presented to the ED today for evaluation of chest pain. Details of the patient's history can be seen in the HPI. Differential diagnosis included ACS, dissection, pneumothorax, pneumonia, PE, costochondritis, pericarditis, panic attack, gastric reflux, amongst others. Workup in the ED yielded no acute abnormalities with CBC or BMP. Initial troponin returned negative.  I did not feel that this need to be repeated as the patient has been having discomfort since 4:00 this morning, while over 12 hours at this point.  ECG did not show any evidence of STEMI.  The patient's heart score is found to be 3, low risk.  The patient was low risk on Wells criteria,  however she was not PERC negative due to her age.  Also given her pain which she noted to me had been moving towards her back, I did feel that she should have a dimer completed.  This was elevated, and CT PE study was completed, returning showing no evidence of PE or other pathology.  Additional labs indicated mild hypokalemia, which was supplemented orally.  I do suspect that the patient's discomfort is largely due to her fibromyalgia as well as anxiety as she was tearful, anxious, and had numerous increase stressors recently.  Advised her to continue her normal medications for fibromyalgia.  She has no pain here at this time.  She will follow-up with her primary early next week for recheck.  In the meantime, return for any changing worsening symptoms, worsening chest pain, shortness of breath, diaphoresis, nausea, new concerns.  She did feel improved after the Ativan here, again indicating that anxiety is likely playing a role.  All questions were answered prior to discharge.  She was in agreement with the treatment plan as stated above.    Discharge Diagnosis:    ICD-10-CM    1. Atypical chest pain R07.89    2. Anxiety F41.9        Disposition:  The patient is discharged to home.    Scribe Disclosure:  I, Jamie Noonan, am serving as a scribe at 6:30 PM on 1/25/2020 to document services personally performed by Katya Cuadra PA based on my observations and the provider's statements to me.      1/25/2020   Katya Cuadra PA      This was created at least in part with a voice recognition software. Mistakes/typos may be present.      Katya Cuadra PA  01/25/20 [id number]

## 2020-01-26 NOTE — DISCHARGE INSTRUCTIONS
Continue normal fibromyalgia medications at home and you can add Tylenol, ibuprofen, the stronger pain medications if you feel a significant increase in discomfort.  See your primary this week for recheck of symptoms.  Return if you feel any changing worsening chest pain, shortness of breath, nausea, diaphoresis, new concerns.     Discharge Instructions  Chest Pain    You have been seen today for chest pain or discomfort.  At this time, your doctor has found no signs that your chest pain is due to a serious or life-threatening condition, (or you have declined more testing and/or admission to the hospital). However, sometimes there is a serious problem that does not show up right away. Your evaluation today may not be complete and you may need further testing and evaluation.     You need to follow-up with your regular doctor within 3 days.    Return to the Emergency Department if:  Your chest pain changes, gets worse, starts to happen more often, or comes with less activity.  You are short of breath.  You get very weak or tired.  You pass out or faint.  You have any new symptoms, like fever, cough, numb legs, or you cough up blood.  You have anything else that worries you.    Until you follow-up with your regular doctor please do the following:  Take one aspirin daily unless you have an allergy or are told not to by your doctor.  If a stress test appointment has been made, go to the appointment.  If you have questions, contact your regular doctor.    If your doctor today has told you to follow-up with your regular doctor, it is very important that you make an appointment with your clinic and go to the appointment.  If you do not follow-up with your primary doctor, it may result in missing an important development which could result in permanent injury or disability and/or lasting pain.  If there is any problem keeping your appointment, call your doctor or return to the Emergency Department.    If you were given a  prescription for medicine here today, be sure to read all of the information (including the package insert) that comes with your prescription.  This will include important information about the medicine, its side effects, and any warnings that you need to know about.  The pharmacist who fills the prescription can provide more information and answer questions you may have about the medicine.  If you have questions or concerns that the pharmacist cannot address, please call or return to the Emergency Department.     Opioid Medication Information    Pain medications are among the most commonly prescribed medicines, so we are including this information for all our patients. If you did not receive pain medication or get a prescription for pain medicine, you can ignore it.     You may have been given a prescription for an opioid (narcotic) pain medicine and/or have received a pain medicine while here in the Emergency Department. These medicines can make you drowsy or impaired. You must not drive, operate dangerous equipment, or engage in any other dangerous activities while taking these medications. If you drive while taking these medications, you could be arrested for DUI, or driving under the influence. Do not drink any alcohol while you are taking these medications.     Opioid pain medications can cause addiction. If you have a history of chemical dependency of any type, you are at a higher risk of becoming addicted to pain medications.  Only take these prescribed medications to treat your pain when all other options have been tried. Take it for as short a time and as few doses as possible. Store your pain pills in a secure place, as they are frequently stolen and provide a dangerous opportunity for children or visitors in your house to start abusing these powerful medications. We will not replace any lost or stolen medicine.  As soon as your pain is better, you should flush all your remaining medication.     Many  prescription pain medications contain Tylenol  (acetaminophen), including Vicodin , Tylenol #3 , Norco , Lortab , and Percocet .  You should not take any extra pills of Tylenol  if you are using these prescription medications or you can get very sick.  Do not ever take more than 3000 mg of acetaminophen in any 24 hour period.    All opioids tend to cause constipation. Drink plenty of water and eat foods that have a lot of fiber, such as fruits, vegetables, prune juice, apple juice and high fiber cereal.  Take a laxative if you don t move your bowels at least every other day. Miralax , Milk of Magnesia, Colace , or Senna  can be used to keep you regular.      Remember that you can always come back to the Emergency Department if you are not able to see your regular doctor in the amount of time listed above, if you get any new symptoms, or if there is anything that worries you.

## 2020-01-26 NOTE — ED TRIAGE NOTES
Pt has left sided chest pain for past 2 days.  She reports being worn down and weak with caregiving of her mother and recent loss of father.  She recently had flu shot and thought it was related to flu shot.  Also has bulging disc in her back.

## 2020-09-03 ENCOUNTER — HOSPITAL ENCOUNTER (EMERGENCY)
Facility: CLINIC | Age: 60
Discharge: HOME | End: 2020-09-03
Attending: EMERGENCY MEDICINE | Admitting: EMERGENCY MEDICINE
Payer: COMMERCIAL

## 2020-09-03 VITALS
TEMPERATURE: 98.5 F | OXYGEN SATURATION: 97 % | DIASTOLIC BLOOD PRESSURE: 87 MMHG | RESPIRATION RATE: 17 BRPM | HEART RATE: 70 BPM | SYSTOLIC BLOOD PRESSURE: 116 MMHG

## 2020-09-03 DIAGNOSIS — E87.6 HYPOKALEMIA: ICD-10-CM

## 2020-09-03 DIAGNOSIS — H00.15 CHALAZION OF LEFT LOWER EYELID: ICD-10-CM

## 2020-09-03 LAB
ANION GAP SERPL CALCULATED.3IONS-SCNC: 5 MMOL/L (ref 3–14)
BUN SERPL-MCNC: 14 MG/DL (ref 7–30)
CALCIUM SERPL-MCNC: 9.5 MG/DL (ref 8.5–10.1)
CHLORIDE SERPL-SCNC: 101 MMOL/L (ref 94–109)
CO2 SERPL-SCNC: 29 MMOL/L (ref 20–32)
CREAT SERPL-MCNC: 0.82 MG/DL (ref 0.52–1.04)
GFR SERPL CREATININE-BSD FRML MDRD: 79 ML/MIN/{1.73_M2}
GLUCOSE SERPL-MCNC: 104 MG/DL (ref 70–99)
MAGNESIUM SERPL-MCNC: 2.2 MG/DL (ref 1.6–2.3)
POTASSIUM SERPL-SCNC: 2.5 MMOL/L (ref 3.4–5.3)
SODIUM SERPL-SCNC: 135 MMOL/L (ref 133–144)
TROPONIN I SERPL-MCNC: <0.015 UG/L (ref 0–0.04)

## 2020-09-03 PROCEDURE — 25000132 ZZH RX MED GY IP 250 OP 250 PS 637: Performed by: EMERGENCY MEDICINE

## 2020-09-03 PROCEDURE — 99284 EMERGENCY DEPT VISIT MOD MDM: CPT | Mod: 25

## 2020-09-03 PROCEDURE — 83735 ASSAY OF MAGNESIUM: CPT | Performed by: EMERGENCY MEDICINE

## 2020-09-03 PROCEDURE — 80048 BASIC METABOLIC PNL TOTAL CA: CPT | Performed by: EMERGENCY MEDICINE

## 2020-09-03 PROCEDURE — 96365 THER/PROPH/DIAG IV INF INIT: CPT

## 2020-09-03 PROCEDURE — 93005 ELECTROCARDIOGRAM TRACING: CPT

## 2020-09-03 PROCEDURE — 25000128 H RX IP 250 OP 636: Performed by: EMERGENCY MEDICINE

## 2020-09-03 PROCEDURE — 84484 ASSAY OF TROPONIN QUANT: CPT | Performed by: EMERGENCY MEDICINE

## 2020-09-03 RX ORDER — POTASSIUM CL/LIDO/0.9 % NACL 10MEQ/0.1L
10 INTRAVENOUS SOLUTION, PIGGYBACK (ML) INTRAVENOUS
Status: DISCONTINUED | OUTPATIENT
Start: 2020-09-03 | End: 2020-09-04 | Stop reason: HOSPADM

## 2020-09-03 RX ORDER — POTASSIUM CHLORIDE 1500 MG/1
40 TABLET, EXTENDED RELEASE ORAL ONCE
Status: COMPLETED | OUTPATIENT
Start: 2020-09-03 | End: 2020-09-03

## 2020-09-03 RX ORDER — POTASSIUM CHLORIDE 1500 MG/1
20 TABLET, EXTENDED RELEASE ORAL DAILY
Qty: 3 TABLET | Refills: 0 | Status: SHIPPED | OUTPATIENT
Start: 2020-09-03 | End: 2020-09-06

## 2020-09-03 RX ADMIN — Medication 10 MEQ: at 21:24

## 2020-09-03 RX ADMIN — POTASSIUM CHLORIDE 40 MEQ: 1500 TABLET, EXTENDED RELEASE ORAL at 21:24

## 2020-09-03 ASSESSMENT — ENCOUNTER SYMPTOMS
NAUSEA: 0
DYSPHORIC MOOD: 1
APPETITE CHANGE: 0
VOMITING: 0
DIARRHEA: 0

## 2020-09-03 NOTE — ED TRIAGE NOTES
Patient comes in from clinic for evaluation of potassium of 2.7. Was in clinic for annual follow-up. ABCs intact.

## 2020-09-04 LAB — INTERPRETATION ECG - MUSE: NORMAL

## 2020-09-04 NOTE — DISCHARGE INSTRUCTIONS
Increase dietary potassium.  Potassium pills for the next 3 days then follow-up with your doctor early next week for repeat check.  If you have weakness, vomiting, palpitations, or any other new or concerning symptoms in the meantime you should return to the emergency department.    Regarding your eye, you should do warm compresses.  If you have worsening issues with your eyes, I would recommend seeing a ophthalmologist.    Continue to follow with your therapist and psychiatrist.  Return if you are having worsening thoughts of harming yourself.

## 2020-09-04 NOTE — ED PROVIDER NOTES
"  History     Chief Complaint:  Abnormal Labs     The history is provided by the patient, medical records and a relative.      Moni Liriano is a 60 year old female who presents with abnormal labs.  Moni has been in her baseline state of physical health recently denying nausea, vomiting, or diarrhea.  She does struggle with chronic pain, fibromyalgia, Ménière's disease, and tinnitus.  Her doctor recommended she have laboratory tests done today when Moni was taking her sister for an appointment.  She was then called and told to present to the emergency department because of hypokalemia of 2.9 (labs below).  Moni spends most of her time talking about stressors in her life and as well as her mental wellbeing.  She has a therapist and psychiatrist who she is in close contact with weekly.  She has had some passive suicidal thoughts but \"would never do that\".  She has been struggling with taking care of her family and processing the death of her parents.  She declines offer for mental health assessment in the emergency department    Her daughter would also like me to evaluate \"styes\" on her mother's eyes as she is afraid they may \"move up\".    Outpatient labs today:  ESR: 12  CRP: <0.5  Hepatic function panel: all WNL  BMP: K 2.9, glucose 111, otherwise WNL  CK: 72  CBC: all WNL  UA: all WNL    Allergies:  Shellfish Allergy  Morphine  Penicillins     Medications:    Trazodone  Desyrel  Topamax  Senokot  Glycolax  Roxicodone  Oxycontin  Omeprazole   Pamelor  Naproxen  Antivert  Lamictal  Atarax  Hydrodiuril  Mucinex  Gabapentin  Advair  Flonase  Fexofenadine  Estrace  Cymbalta  Colace  Cyclobenzaprine  Klonopin  Biotin  Aspirin   Aripiprazole  Elavil  Albuterol   Tylenol     Past Medical History:    Anemia              Anxiety              Arthritis              Autoimmune disorder    Bipolar 1 disorder          Blood transfusion          Borderline diabetes       Clotting disorder   COPD   Depression        History of " blood transfusion      Hypercholesterolemia   Insomnia           Leiomyoma of uterus    Neck pain          Obese    Other chronic pain        Uncomplicated asthma             Diabetes type 2     Past Surgical History:    Appendectomy   Arthrodesis foot  Arthroplasty knee    section   Cholecystectomy   D&C  [place] teeth extraction   Gastric bypass   Shoulder arthroscopy   Tubal ligation   Endometrial ablation   Tonsillectomy      Family History:    Psychiatric illness  Heart disease     Social History:  Smoking Status: Former Smoker  Smokeless Tobacco: Never Used  Alcohol Use: No  Drug Use: No  PCP: Gianna Smith   Presents with her daughter.    Review of Systems   Constitutional: Negative for appetite change.   Gastrointestinal: Negative for diarrhea, nausea and vomiting.   Psychiatric/Behavioral: Positive for dysphoric mood and suicidal ideas. Negative for self-injury.   All other systems reviewed and are negative.    Physical Exam     Patient Vitals for the past 24 hrs:   BP Temp Temp src Pulse Resp SpO2   20 2230 116/87 -- -- 70 17 97 %   20 2200 124/82 -- -- 72 16 95 %   20 2130 131/84 -- -- 71 13 98 %   20 2100 114/74 -- -- 70 21 99 %   20 2030 124/79 -- -- 59 9 99 %   20 2025 129/78 -- -- 70 15 98 %   20 1853 (!) 134/95 98.5  F (36.9  C) Oral 76 16 99 %        Physical Exam  General: Well-developed and well-nourished. Well appearing middle aged  woman.  Head:  Atraumatic.  Eyes:  Sclerae are normal. There is a chalazion on the left inferior lid with mild conjunctival irritation here. I appreciate no other chalazions or hordeolums.  Neck:  Supple. Normal range of motion.  CV:  Regular rate and rhythm. Normal heart sounds with no murmurs, rubs, or gallops detected.  Resp:  No respiratory distress. Clear to auscultation bilaterally without decreased breath sounds, wheezing, rales, or rhonchi.  GI:  Soft. Non-distended. Non-tender.    MS:  Normal  ROM.  Skin:  Warm. Non-diaphoretic. No pallor.  Neuro: Awake. A&Ox3. Normal strength.  Psych:  Dysphoric, angry mood and affect. Normal speech. Not responding to internal stimuli. No plan of suicide.  Vitals reviewed.    Emergency Department Course     EKG  Indication: Abnormal labs  Time: 1905  Rate 60 bpm. CO interval 154. QRS duration 102. QT/QTc 432/432.   Normal sinus rhythm  T wave abnormality, consider anterior ischemia   Abnormal EKG    No acute ST changes.  T wave changes V2 more prominent than previous as compared to prior, dated 1/25/20.     Laboratory:  Laboratory findings were communicated with the patient who voiced understanding of the findings.    BMP: potassium 2.5 (LL),  (H) o/w WNL (Creatinine 0.82)  Troponin (Collected 2020): <0.015  Magnesium: 2.2    Interventions:  2124 Klor-Con 40 mEq Oral   Potassium chloride 10 mEq IV     Emergency Department Course:    1947 Nursing notes and vitals reviewed.     I performed an exam of the patient as documented above.     2020 IV was inserted and blood was drawn for laboratory testing, results above.     2242 Patient rechecked and updated.       Findings and plan explained to the patient. Patient discharged home with instructions regarding supportive care, medications, and reasons to return. The importance of close follow-up was reviewed.     Impression & Plan    Medical Decision Making:  Moni is a 60 year old woman who had outpatient labs today that seem to be only for screening purposes as they had not had been done in some time. She was then called by her doctor who to her to present to the emergency department for hypokalemia of 2.9.  She describes no physical symptoms aside from her chronic pain and a chalazion on her left eyelid. For this I recommended warm compresses. She does have many mental health concerns and does occasionally think about suicide though she denies current suicidality or plan.  She already has outpatient resources for this  and declines offer for DEC assessment in the ER.  I rechecked her BMP which does reveal hypokalemia to 2.5.  Magnesium is normal.  EKG does not reveal concerning changes although some of her T wave changes are slightly more prominent than prior.  Troponin is undetectable as expected.  She was given 10 mEq potassium chloride IV as well 40 mEq PO. She continued to have no physical complaints and is appropriate for discharge.  I will send her home with potassium for the next 3 days and instructions to increase dietary potassium with plan to follow-up with her primary care provider for recheck after this.  I provided return precautions in the interim and answered all her and her daughter's questions. They verbalized understanding and are amenable to discharge.    After discharge, I again reviewed Moni's medications and noticed she is taking hydrochlorothiazide.  This is likely contributing to her hypokalemia.  I called her and recommended she hold the hydrochlorothiazide for the next 3 days while taking the potassium supplements. After this, she should discuss with her doctor if it should be resumed alone or with daily potassium supplementation or if it does not be resumed/should be changed.  Moni verbalized understanding of this plan.    Diagnosis:    ICD-10-CM    1. Hypokalemia  E87.6    2. Chalazion of left lower eyelid  H00.15         Disposition:   The patient was discharged home.     Discharge Medications:  Discharge Medication List as of 9/3/2020 10:42 PM      START taking these medications    Details   potassium chloride ER (KLOR-CON M) 20 MEQ CR tablet Take 1 tablet (20 mEq) by mouth daily for 3 days, Disp-3 tablet,R-0, Local Print             Scribe Disclosure:  I, Bj Carrasco, am serving as a scribe at 7:52 PM on 9/3/2020 to document services personally performed by Kasey Waldron MD based on my observations and the provider's statements to me.        Kasey Waldron MD  09/04/20 [id number]

## 2021-01-14 ENCOUNTER — HEALTH MAINTENANCE LETTER (OUTPATIENT)
Age: 61
End: 2021-01-14

## 2021-10-23 ENCOUNTER — HEALTH MAINTENANCE LETTER (OUTPATIENT)
Age: 61
End: 2021-10-23

## 2022-02-12 ENCOUNTER — HEALTH MAINTENANCE LETTER (OUTPATIENT)
Age: 62
End: 2022-02-12

## 2022-10-10 ENCOUNTER — HEALTH MAINTENANCE LETTER (OUTPATIENT)
Age: 62
End: 2022-10-10

## 2023-03-25 ENCOUNTER — HEALTH MAINTENANCE LETTER (OUTPATIENT)
Age: 63
End: 2023-03-25

## 2024-08-01 NOTE — ED AVS SNAPSHOT
[facility] Emergency Department  [address]  [facility] [address]  Phone:  [phone]  Fax:  [phone]                                    Moni Liriano   MRN: [id number]    Department:  [facility] Emergency Department   Date of Visit:  9/3/2020           After Visit Summary Signature Page    I have received my discharge instructions, and my questions have been answered. I have discussed any challenges I see with this plan with the nurse or doctor.    ..........................................................................................................................................  Patient/Patient Representative Signature      ..........................................................................................................................................  Patient Representative Print Name and Relationship to Patient    ..................................................               ................................................  Date                                   Time    ..........................................................................................................................................  Reviewed by Signature/Title    ...................................................              ..............................................  Date                                               Time          22EPIC Rev 08/18        [Fully active, able to carry on all pre-disease performance without restriction] : Status 0 - Fully active, able to carry on all pre-disease performance without restriction [Normal] : bilateral breasts without nipple retraction, skin dimpling or palpable masses; the bilateral axillae are without adenopathy [de-identified] : Small resolving hematoma in the superior right breast.

## (undated) DEVICE — GOWN IMPERVIOUS SPECIALTY XLG/XLONG 32474

## (undated) DEVICE — PACK EXTREMITY SOP15EXFSD

## (undated) DEVICE — ESU GROUND PAD ADULT W/CORD E7507

## (undated) DEVICE — GLOVE PROTEXIS POWDER FREE 8.0 ORTHOPEDIC 2D73ET80

## (undated) DEVICE — Device

## (undated) DEVICE — DRILL AO PARAGON 28 2.0X110MM P99-100-2011

## (undated) DEVICE — SU MONOCRYL 3-0 PS-2 27" Y427H

## (undated) DEVICE — PERFORATOR BONE FENESTRATION P99-100-2009

## (undated) DEVICE — SU VICRYL 2-0 CT-2 27" J333H

## (undated) DEVICE — DRAPE MINI C-ARM 4003

## (undated) DEVICE — SYR BULB IRRIG DOVER 60 ML LATEX FREE 67000

## (undated) DEVICE — SU ETHILON 4-0 FS-2 18" 662H

## (undated) DEVICE — LINEN TOWEL PACK X5 5464

## (undated) DEVICE — CAST PADDING 4" STERILE 9044S

## (undated) DEVICE — PREP CHLORAPREP 26ML TINTED ORANGE  260815

## (undated) DEVICE — GLOVE PROTEXIS BLUE W/NEU-THERA 8.0  2D73EB80

## (undated) DEVICE — SOL WATER IRRIG 1000ML BOTTLE 2F7114

## (undated) DEVICE — DRAPE IOBAN INCISE 23X17" 6650EZ

## (undated) DEVICE — GLOVE PROTEXIS POWDER FREE 7.5 ORTHOPEDIC 2D73ET75

## (undated) RX ORDER — OXYCODONE HYDROCHLORIDE 5 MG/1
TABLET ORAL
Status: DISPENSED
Start: 2018-06-04

## (undated) RX ORDER — FENTANYL CITRATE 50 UG/ML
INJECTION, SOLUTION INTRAMUSCULAR; INTRAVENOUS
Status: DISPENSED
Start: 2018-06-04

## (undated) RX ORDER — BUPIVACAINE HYDROCHLORIDE 2.5 MG/ML
INJECTION, SOLUTION EPIDURAL; INFILTRATION; INTRACAUDAL
Status: DISPENSED
Start: 2018-06-04

## (undated) RX ORDER — PROPOFOL 10 MG/ML
INJECTION, EMULSION INTRAVENOUS
Status: DISPENSED
Start: 2018-06-04

## (undated) RX ORDER — CEFAZOLIN SODIUM 2 G/100ML
INJECTION, SOLUTION INTRAVENOUS
Status: DISPENSED
Start: 2018-06-04